# Patient Record
Sex: FEMALE | Race: WHITE | Employment: OTHER | URBAN - METROPOLITAN AREA
[De-identification: names, ages, dates, MRNs, and addresses within clinical notes are randomized per-mention and may not be internally consistent; named-entity substitution may affect disease eponyms.]

---

## 2018-01-09 ENCOUNTER — HOSPITAL ENCOUNTER (OUTPATIENT)
Dept: MAMMOGRAPHY | Age: 58
Discharge: HOME | End: 2018-01-09
Attending: FAMILY MEDICINE | Admitting: FAMILY MEDICINE

## 2018-01-09 DIAGNOSIS — Z12.31 SCREENING MAMMOGRAM, ENCOUNTER FOR: ICD-10-CM

## 2018-01-25 ENCOUNTER — HOSPITAL ENCOUNTER (OUTPATIENT)
Dept: MAMMOGRAPHY | Age: 58
Discharge: HOME | End: 2018-01-25
Attending: FAMILY MEDICINE | Admitting: FAMILY MEDICINE

## 2018-01-25 DIAGNOSIS — R92.8 ABNORMAL MAMMOGRAM: ICD-10-CM

## 2018-01-25 DIAGNOSIS — R92.8 OTHER ABNORMAL AND INCONCLUSIVE FINDINGS ON DIAGNOSTIC IMAGING OF BREAST: ICD-10-CM

## 2019-01-10 ENCOUNTER — APPOINTMENT (OUTPATIENT)
Dept: GENERAL RADIOLOGY | Age: 59
End: 2019-01-10
Payer: MEDICARE

## 2019-01-10 ENCOUNTER — HOSPITAL ENCOUNTER (EMERGENCY)
Age: 59
Discharge: HOME | End: 2019-01-10
Payer: MEDICARE

## 2019-01-10 VITALS
TEMPERATURE: 98.3 F | DIASTOLIC BLOOD PRESSURE: 66 MMHG | OXYGEN SATURATION: 96 % | HEART RATE: 74 BPM | SYSTOLIC BLOOD PRESSURE: 127 MMHG | RESPIRATION RATE: 18 BRPM

## 2019-01-10 DIAGNOSIS — J06.9 VIRAL URI WITH COUGH: Primary | ICD-10-CM

## 2019-01-10 LAB
EKG ATRIAL RATE: 76 BPM
EKG DIAGNOSIS: NORMAL
EKG P AXIS: 59 DEGREES
EKG P-R INTERVAL: 148 MS
EKG Q-T INTERVAL: 394 MS
EKG QRS DURATION: 84 MS
EKG QTC CALCULATION (BAZETT): 443 MS
EKG R AXIS: 43 DEGREES
EKG T AXIS: 45 DEGREES
EKG VENTRICULAR RATE: 76 BPM

## 2019-01-10 PROCEDURE — 71046 X-RAY EXAM CHEST 2 VIEWS: CPT

## 2019-01-10 PROCEDURE — 99283 EMERGENCY DEPT VISIT LOW MDM: CPT

## 2019-01-10 PROCEDURE — 93005 ELECTROCARDIOGRAM TRACING: CPT | Performed by: EMERGENCY MEDICINE

## 2019-01-10 PROCEDURE — 93010 ELECTROCARDIOGRAM REPORT: CPT | Performed by: INTERNAL MEDICINE

## 2019-01-10 RX ORDER — BROMPHENIRAMINE MALEATE, PSEUDOEPHEDRINE HYDROCHLORIDE, AND DEXTROMETHORPHAN HYDROBROMIDE 2; 30; 10 MG/5ML; MG/5ML; MG/5ML
10 SYRUP ORAL 4 TIMES DAILY PRN
Qty: 120 ML | Refills: 0 | Status: SHIPPED | OUTPATIENT
Start: 2019-01-10 | End: 2019-07-15 | Stop reason: ALTCHOICE

## 2019-01-10 ASSESSMENT — ENCOUNTER SYMPTOMS
EYE PAIN: 0
BACK PAIN: 0
CONSTIPATION: 0
SHORTNESS OF BREATH: 0
EYE REDNESS: 0
NAUSEA: 0
CHEST TIGHTNESS: 0
FACIAL SWELLING: 0
ABDOMINAL PAIN: 0
SORE THROAT: 0
COUGH: 1
RHINORRHEA: 0
DIARRHEA: 0

## 2019-07-15 ENCOUNTER — HOSPITAL ENCOUNTER (EMERGENCY)
Age: 59
Discharge: HOME | End: 2019-07-15
Attending: EMERGENCY MEDICINE
Payer: MEDICARE

## 2019-07-15 VITALS
TEMPERATURE: 97.5 F | DIASTOLIC BLOOD PRESSURE: 56 MMHG | SYSTOLIC BLOOD PRESSURE: 133 MMHG | WEIGHT: 210 LBS | RESPIRATION RATE: 18 BRPM | OXYGEN SATURATION: 97 % | BODY MASS INDEX: 37.2 KG/M2 | HEART RATE: 83 BPM

## 2019-07-15 DIAGNOSIS — S39.012A BACK STRAIN, INITIAL ENCOUNTER: Primary | ICD-10-CM

## 2019-07-15 DIAGNOSIS — R11.2 NON-INTRACTABLE VOMITING WITH NAUSEA, UNSPECIFIED VOMITING TYPE: ICD-10-CM

## 2019-07-15 LAB
A/G RATIO: 1.3 (ref 1.1–2.2)
ALBUMIN SERPL-MCNC: 4.5 G/DL (ref 3.4–5)
ALP BLD-CCNC: 85 U/L (ref 40–129)
ALT SERPL-CCNC: 24 U/L (ref 10–40)
ANION GAP SERPL CALCULATED.3IONS-SCNC: 13 MMOL/L (ref 3–16)
AST SERPL-CCNC: 20 U/L (ref 15–37)
BASOPHILS ABSOLUTE: 0 K/UL (ref 0–0.2)
BASOPHILS RELATIVE PERCENT: 0.6 %
BILIRUB SERPL-MCNC: 0.3 MG/DL (ref 0–1)
BILIRUBIN URINE: NEGATIVE
BLOOD, URINE: NEGATIVE
BUN BLDV-MCNC: 12 MG/DL (ref 7–20)
CALCIUM SERPL-MCNC: 9.5 MG/DL (ref 8.3–10.6)
CHLORIDE BLD-SCNC: 101 MMOL/L (ref 99–110)
CLARITY: CLEAR
CO2: 26 MMOL/L (ref 21–32)
COLOR: YELLOW
CREAT SERPL-MCNC: <0.5 MG/DL (ref 0.6–1.2)
EKG ATRIAL RATE: 73 BPM
EKG DIAGNOSIS: NORMAL
EKG P AXIS: 29 DEGREES
EKG P-R INTERVAL: 150 MS
EKG Q-T INTERVAL: 398 MS
EKG QRS DURATION: 88 MS
EKG QTC CALCULATION (BAZETT): 438 MS
EKG R AXIS: 29 DEGREES
EKG T AXIS: 22 DEGREES
EKG VENTRICULAR RATE: 73 BPM
EOSINOPHILS ABSOLUTE: 0.1 K/UL (ref 0–0.6)
EOSINOPHILS RELATIVE PERCENT: 1.9 %
GFR AFRICAN AMERICAN: >60
GFR NON-AFRICAN AMERICAN: >60
GLOBULIN: 3.5 G/DL
GLUCOSE BLD-MCNC: 158 MG/DL (ref 70–99)
GLUCOSE URINE: NEGATIVE MG/DL
HCT VFR BLD CALC: 39.2 % (ref 36–48)
HEMOGLOBIN: 13.3 G/DL (ref 12–16)
KETONES, URINE: NEGATIVE MG/DL
LEUKOCYTE ESTERASE, URINE: NEGATIVE
LIPASE: 45 U/L (ref 13–60)
LYMPHOCYTES ABSOLUTE: 2 K/UL (ref 1–5.1)
LYMPHOCYTES RELATIVE PERCENT: 32.5 %
MCH RBC QN AUTO: 30.6 PG (ref 26–34)
MCHC RBC AUTO-ENTMCNC: 34 G/DL (ref 31–36)
MCV RBC AUTO: 90 FL (ref 80–100)
MICROSCOPIC EXAMINATION: NORMAL
MONOCYTES ABSOLUTE: 0.4 K/UL (ref 0–1.3)
MONOCYTES RELATIVE PERCENT: 6.5 %
NEUTROPHILS ABSOLUTE: 3.7 K/UL (ref 1.7–7.7)
NEUTROPHILS RELATIVE PERCENT: 58.5 %
NITRITE, URINE: NEGATIVE
PDW BLD-RTO: 13.8 % (ref 12.4–15.4)
PH UA: 5.5 (ref 5–8)
PLATELET # BLD: 242 K/UL (ref 135–450)
PMV BLD AUTO: 7.9 FL (ref 5–10.5)
POTASSIUM REFLEX MAGNESIUM: 3.8 MMOL/L (ref 3.5–5.1)
PROTEIN UA: NEGATIVE MG/DL
RBC # BLD: 4.35 M/UL (ref 4–5.2)
SODIUM BLD-SCNC: 140 MMOL/L (ref 136–145)
SPECIFIC GRAVITY UA: <=1.005 (ref 1–1.03)
TOTAL PROTEIN: 8 G/DL (ref 6.4–8.2)
TROPONIN: <0.01 NG/ML
URINE REFLEX TO CULTURE: NORMAL
URINE TYPE: NORMAL
UROBILINOGEN, URINE: 0.2 E.U./DL
WBC # BLD: 6.3 K/UL (ref 4–11)

## 2019-07-15 PROCEDURE — 81003 URINALYSIS AUTO W/O SCOPE: CPT

## 2019-07-15 PROCEDURE — 93005 ELECTROCARDIOGRAM TRACING: CPT | Performed by: EMERGENCY MEDICINE

## 2019-07-15 PROCEDURE — 85025 COMPLETE CBC W/AUTO DIFF WBC: CPT

## 2019-07-15 PROCEDURE — 96375 TX/PRO/DX INJ NEW DRUG ADDON: CPT

## 2019-07-15 PROCEDURE — 84484 ASSAY OF TROPONIN QUANT: CPT

## 2019-07-15 PROCEDURE — 93010 ELECTROCARDIOGRAM REPORT: CPT | Performed by: INTERNAL MEDICINE

## 2019-07-15 PROCEDURE — 6370000000 HC RX 637 (ALT 250 FOR IP): Performed by: EMERGENCY MEDICINE

## 2019-07-15 PROCEDURE — 83690 ASSAY OF LIPASE: CPT

## 2019-07-15 PROCEDURE — 80053 COMPREHEN METABOLIC PANEL: CPT

## 2019-07-15 PROCEDURE — 99284 EMERGENCY DEPT VISIT MOD MDM: CPT

## 2019-07-15 PROCEDURE — 96374 THER/PROPH/DIAG INJ IV PUSH: CPT

## 2019-07-15 PROCEDURE — 6360000002 HC RX W HCPCS: Performed by: EMERGENCY MEDICINE

## 2019-07-15 RX ORDER — MONTELUKAST SODIUM 10 MG/1
10 TABLET ORAL NIGHTLY
COMMUNITY

## 2019-07-15 RX ORDER — AMMONIUM LACTATE 12 G/100G
CREAM TOPICAL 2 TIMES DAILY
COMMUNITY

## 2019-07-15 RX ORDER — FLUTICASONE PROPIONATE 50 MCG
1 SPRAY, SUSPENSION (ML) NASAL DAILY
COMMUNITY

## 2019-07-15 RX ORDER — KETOROLAC TROMETHAMINE 30 MG/ML
30 INJECTION, SOLUTION INTRAMUSCULAR; INTRAVENOUS ONCE
Status: COMPLETED | OUTPATIENT
Start: 2019-07-15 | End: 2019-07-15

## 2019-07-15 RX ORDER — ESOMEPRAZOLE MAGNESIUM 40 MG/1
40 CAPSULE, DELAYED RELEASE ORAL
COMMUNITY

## 2019-07-15 RX ORDER — ONDANSETRON 2 MG/ML
4 INJECTION INTRAMUSCULAR; INTRAVENOUS
Status: DISCONTINUED | OUTPATIENT
Start: 2019-07-15 | End: 2019-07-15 | Stop reason: HOSPADM

## 2019-07-15 RX ORDER — LIDOCAINE 4 G/G
1 PATCH TOPICAL ONCE
Status: DISCONTINUED | OUTPATIENT
Start: 2019-07-15 | End: 2019-07-15 | Stop reason: HOSPADM

## 2019-07-15 RX ORDER — 0.9 % SODIUM CHLORIDE 0.9 %
1000 INTRAVENOUS SOLUTION INTRAVENOUS ONCE
Status: DISCONTINUED | OUTPATIENT
Start: 2019-07-15 | End: 2019-07-15 | Stop reason: HOSPADM

## 2019-07-15 RX ORDER — NYSTATIN 100000 U/G
CREAM TOPICAL
COMMUNITY

## 2019-07-15 RX ADMIN — ONDANSETRON 4 MG: 2 INJECTION INTRAMUSCULAR; INTRAVENOUS at 18:40

## 2019-07-15 RX ADMIN — KETOROLAC TROMETHAMINE 30 MG: 30 INJECTION, SOLUTION INTRAMUSCULAR at 18:40

## 2019-07-15 ASSESSMENT — PAIN DESCRIPTION - LOCATION: LOCATION: CHEST;BACK

## 2019-07-15 ASSESSMENT — PAIN SCALES - GENERAL
PAINLEVEL_OUTOF10: 5
PAINLEVEL_OUTOF10: 5

## 2019-07-15 NOTE — ED PROVIDER NOTES
[address] ED      CHIEF COMPLAINT  Chest Pain (c/o chest and back pain.  ) and Back Pain       HISTORY OF PRESENT ILLNESS  Nidhi Gutierrez is a 62 y.o. female  who presents to the ED complaining of back pain. Patient states that earlier she was trying to take a large pill and it felt like it got stuck. She then vomited while at her apartment so went up to the office. They called 911 and EMS brought her here for further evaluation. She states that when the pill get stuck she was having some chest discomfort but that has resolved. No current abdominal pain or further vomiting. Vomitus was nonbloody and nonbilious. She is unable to really tell me when her last bowel movement was. She has a history of MR so history is somewhat limited secondary to this. The office staff is currently with her but did not witness the vomiting and only called EMS when she had presented to their office complaining of the back pain. She points to the right lateral lower back when asked where it hurts. She has been able to ambulate. No weakness or numbness or tingling. No saddle anesthesia. No loss of bowel or bladder control. No other complaints, modifying factors or associated symptoms. I have reviewed the following from the nursing documentation. Past Medical History:   Diagnosis Date    Allergic rhinitis     Diabetes mellitus ([address]     Hypercholesteremia     Mental retardation     No history of procedure 2/29/16    no past colonoscopy     Past Surgical History:   Procedure Laterality Date    ANKLE SURGERY      COLONOSCOPY  01/10/2012    TUBAL LIGATION      UPPER GASTROINTESTINAL ENDOSCOPY  2/29/16     History reviewed. No pertinent family history.   Social History     Socioeconomic History    Marital status: Single     Spouse name: Not on file    Number of children: Not on file    Years of education: Not on file    Highest education level: Not on file   Occupational History    Not on file supportive treatment and close follow-up. Reasons to return to the emergency department were discussed at length with both her as well as the office staff and all questions were answered prior to discharge. I estimate there is LOW risk for BOWEL OBSTRUCTION, ESOPHAGEAL FOREIGN BODY, AAA, ACUTE CORONARY SYNDROME, INTRACRANIAL HEMORRHAGE, MALIGNANT DYSRHYTHMIA or HYPERTENSION, PULMONARY EMBOLISM, SEPSIS, SUBARACHNOID HEMORRHAGE, SUBDURAL HEMATOMA, STROKE, or THORACIC AORTIC DISSECTION, thus I consider the discharge disposition reasonable. Greg Ayala and I have discussed the diagnosis and risks, and we agree with discharging home to follow-up with their primary doctor. We also discussed returning to the Emergency Department immediately if new or worsening symptoms occur. We have discussed the symptoms which are most concerning (e.g., bloody sputum, fever, worsening pain or shortness of breath, vomiting, weakness) that necessitate immediate return. During the patient's ED course, the patient was given:  Medications   ondansetron (ZOFRAN) injection 4 mg (4 mg Intravenous Given 7/15/19 1840)   lidocaine 4 % external patch 1 patch (1 patch Transdermal Patch Applied 7/15/19 1935)   0.9 % sodium chloride bolus (0 mLs Intravenous Held 7/15/19 1953)   ketorolac (TORADOL) injection 30 mg (30 mg Intravenous Given 7/15/19 1840)        CLINICAL IMPRESSION  1. Back strain, initial encounter    2. Non-intractable vomiting with nausea, unspecified vomiting type        Blood pressure (!) 133/56, pulse 83, temperature 97.5 °F (36.4 °C), temperature source Oral, resp. rate 18, weight 210 lb (95.3 kg), SpO2 97 %. DISPOSITION  Greg Ayala was discharged to home in stable condition. Patient was given scripts for the following medications. I counseled patient how to take these medications.    Discharge Medication List as of 7/15/2019  8:08 PM          Follow-up with:  Myriam Mahmood MD  [address] [place] [address] [address] [facility],[address]    Schedule an appointment as soon as possible for a visit in 2 days  For recheck      DISCLAIMER: This chart was created using Dragon dictation software. Efforts were made by me to ensure accuracy, however some errors may be present due to limitations of this technology and occasionally words are not transcribed correctly.         Thao Chavez MD  07/15/19 7278

## 2020-01-01 ENCOUNTER — HOSPITAL ENCOUNTER (EMERGENCY)
Age: 60
Discharge: HOME | End: 2020-01-02
Attending: EMERGENCY MEDICINE
Payer: MEDICARE

## 2020-01-01 ENCOUNTER — APPOINTMENT (OUTPATIENT)
Dept: GENERAL RADIOLOGY | Age: 60
End: 2020-01-01
Payer: MEDICARE

## 2020-01-01 LAB
A/G RATIO: 1.2 (ref 1.1–2.2)
ALBUMIN SERPL-MCNC: 4.4 G/DL (ref 3.4–5)
ALP BLD-CCNC: 76 U/L (ref 40–129)
ALT SERPL-CCNC: 28 U/L (ref 10–40)
ANION GAP SERPL CALCULATED.3IONS-SCNC: 12 MMOL/L (ref 3–16)
AST SERPL-CCNC: 24 U/L (ref 15–37)
BASOPHILS ABSOLUTE: 0 K/UL (ref 0–0.2)
BASOPHILS RELATIVE PERCENT: 0.7 %
BILIRUB SERPL-MCNC: <0.2 MG/DL (ref 0–1)
BILIRUBIN URINE: NEGATIVE
BLOOD, URINE: NEGATIVE
BUN BLDV-MCNC: 13 MG/DL (ref 7–20)
CALCIUM SERPL-MCNC: 9 MG/DL (ref 8.3–10.6)
CHLORIDE BLD-SCNC: 99 MMOL/L (ref 99–110)
CLARITY: ABNORMAL
CO2: 25 MMOL/L (ref 21–32)
COLOR: YELLOW
CREAT SERPL-MCNC: 0.6 MG/DL (ref 0.6–1.2)
D DIMER: <200 NG/ML DDU (ref 0–229)
EKG ATRIAL RATE: 81 BPM
EKG DIAGNOSIS: NORMAL
EKG P AXIS: 74 DEGREES
EKG P-R INTERVAL: 138 MS
EKG Q-T INTERVAL: 380 MS
EKG QRS DURATION: 78 MS
EKG QTC CALCULATION (BAZETT): 441 MS
EKG R AXIS: 21 DEGREES
EKG T AXIS: 36 DEGREES
EKG VENTRICULAR RATE: 81 BPM
EOSINOPHILS ABSOLUTE: 0.2 K/UL (ref 0–0.6)
EOSINOPHILS RELATIVE PERCENT: 2.8 %
GFR AFRICAN AMERICAN: >60
GFR NON-AFRICAN AMERICAN: >60
GLOBULIN: 3.6 G/DL
GLUCOSE BLD-MCNC: 152 MG/DL (ref 70–99)
GLUCOSE URINE: NEGATIVE MG/DL
HCT VFR BLD CALC: 41.3 % (ref 36–48)
HEMOGLOBIN: 13.7 G/DL (ref 12–16)
INR BLD: 1.14 (ref 0.86–1.14)
KETONES, URINE: NEGATIVE MG/DL
LEUKOCYTE ESTERASE, URINE: NEGATIVE
LYMPHOCYTES ABSOLUTE: 2.2 K/UL (ref 1–5.1)
LYMPHOCYTES RELATIVE PERCENT: 41 %
MCH RBC QN AUTO: 30.4 PG (ref 26–34)
MCHC RBC AUTO-ENTMCNC: 33.2 G/DL (ref 31–36)
MCV RBC AUTO: 91.7 FL (ref 80–100)
MICROSCOPIC EXAMINATION: ABNORMAL
MONOCYTES ABSOLUTE: 0.5 K/UL (ref 0–1.3)
MONOCYTES RELATIVE PERCENT: 8.6 %
NEUTROPHILS ABSOLUTE: 2.5 K/UL (ref 1.7–7.7)
NEUTROPHILS RELATIVE PERCENT: 46.9 %
NITRITE, URINE: NEGATIVE
PDW BLD-RTO: 13.9 % (ref 12.4–15.4)
PH UA: 5.5 (ref 5–8)
PLATELET # BLD: 220 K/UL (ref 135–450)
PMV BLD AUTO: 7.4 FL (ref 5–10.5)
POTASSIUM REFLEX MAGNESIUM: 3.9 MMOL/L (ref 3.5–5.1)
PRO-BNP: 31 PG/ML (ref 0–124)
PROTEIN UA: NEGATIVE MG/DL
PROTHROMBIN TIME: 13.2 SEC (ref 10–13.2)
RBC # BLD: 4.5 M/UL (ref 4–5.2)
SODIUM BLD-SCNC: 136 MMOL/L (ref 136–145)
SPECIFIC GRAVITY UA: >=1.03 (ref 1–1.03)
TOTAL PROTEIN: 8 G/DL (ref 6.4–8.2)
TROPONIN: <0.01 NG/ML
TROPONIN: <0.01 NG/ML
URINE REFLEX TO CULTURE: ABNORMAL
URINE TYPE: ABNORMAL
UROBILINOGEN, URINE: 0.2 E.U./DL
WBC # BLD: 5.3 K/UL (ref 4–11)

## 2020-01-01 PROCEDURE — 93010 ELECTROCARDIOGRAM REPORT: CPT | Performed by: INTERNAL MEDICINE

## 2020-01-01 PROCEDURE — 6370000000 HC RX 637 (ALT 250 FOR IP): Performed by: NURSE PRACTITIONER

## 2020-01-01 PROCEDURE — 99285 EMERGENCY DEPT VISIT HI MDM: CPT

## 2020-01-01 PROCEDURE — 84484 ASSAY OF TROPONIN QUANT: CPT

## 2020-01-01 PROCEDURE — 80053 COMPREHEN METABOLIC PANEL: CPT

## 2020-01-01 PROCEDURE — 93005 ELECTROCARDIOGRAM TRACING: CPT | Performed by: EMERGENCY MEDICINE

## 2020-01-01 PROCEDURE — 83880 ASSAY OF NATRIURETIC PEPTIDE: CPT

## 2020-01-01 PROCEDURE — 85379 FIBRIN DEGRADATION QUANT: CPT

## 2020-01-01 PROCEDURE — 85025 COMPLETE CBC W/AUTO DIFF WBC: CPT

## 2020-01-01 PROCEDURE — 81003 URINALYSIS AUTO W/O SCOPE: CPT

## 2020-01-01 PROCEDURE — 85610 PROTHROMBIN TIME: CPT

## 2020-01-01 PROCEDURE — 71046 X-RAY EXAM CHEST 2 VIEWS: CPT

## 2020-01-01 RX ORDER — ASPIRIN 325 MG
325 TABLET ORAL ONCE
Status: COMPLETED | OUTPATIENT
Start: 2020-01-01 | End: 2020-01-01

## 2020-01-01 RX ADMIN — ASPIRIN 325 MG: 325 TABLET ORAL at 19:20

## 2020-01-01 ASSESSMENT — HEART SCORE: ECG: 0

## 2020-01-01 ASSESSMENT — ENCOUNTER SYMPTOMS
RHINORRHEA: 0
ABDOMINAL PAIN: 0
SHORTNESS OF BREATH: 0
COLOR CHANGE: 0
SORE THROAT: 0

## 2020-01-01 ASSESSMENT — PAIN DESCRIPTION - LOCATION: LOCATION: CHEST

## 2020-01-01 ASSESSMENT — PAIN SCALES - WONG BAKER: WONGBAKER_NUMERICALRESPONSE: 4

## 2020-01-02 ENCOUNTER — HOSPITAL ENCOUNTER (OUTPATIENT)
Dept: ULTRASOUND IMAGING | Age: 60
Discharge: HOME | End: 2020-01-02
Payer: MEDICARE

## 2020-01-02 VITALS
HEART RATE: 76 BPM | RESPIRATION RATE: 16 BRPM | BODY MASS INDEX: 37.21 KG/M2 | TEMPERATURE: 97.5 F | HEIGHT: 63 IN | WEIGHT: 210 LBS | DIASTOLIC BLOOD PRESSURE: 61 MMHG | OXYGEN SATURATION: 97 % | SYSTOLIC BLOOD PRESSURE: 114 MMHG

## 2020-01-02 PROCEDURE — 6370000000 HC RX 637 (ALT 250 FOR IP): Performed by: NURSE PRACTITIONER

## 2020-01-02 PROCEDURE — 93971 EXTREMITY STUDY: CPT

## 2020-01-02 RX ADMIN — APIXABAN 10 MG: 5 TABLET, FILM COATED ORAL at 00:09

## 2020-01-02 NOTE — ED PROVIDER NOTES
I independently performed a history and physical on Lex Stewart. All diagnostic, treatment, and disposition decisions were made by myself in conjunction with the advanced practice provider. For further details of [address] [place] [address] emergency department encounter, please see Karol Sanford NP's documentation. Patient is a 78-year-old female presenting today with mild chest discomfort in the center of her chest starting around 6 PM this evening associated with palpitations. She denies any shortness of breath. No radiation into the back, arms, neck. No pain with breathing. No history of blood clots. She did recently break her foot on the left side and does have a boot on. No cough or fever. Due to concern for chest discomfort, she came to the emergency department for further evaluation. Physical:   Gen: No acute distress. AOx3. Psych: Normal mood and affect  HEENT: NCAT, PERRL, MMM  Neck: supple  Chest: Mild tenderness to superior sternum at the area where she is pointing that it hurts   Cardiac: RRR, pulses 2+ in upper extremities, no calf tenderness but 1+ pitting edema to LLE noted on exam, boot on left foot   Lungs: C2AB, no R/R/W  Abdomen: soft and nontender with no R/D/G  Neuro: no focal neuro deficits with strength 5/5 in all 4 extremities          The Ekg interpreted by me shows  normal sinus rhythm with a rate of 81  Axis is   Normal  QTc is  normal  Intervals and Durations are unremarkable. ST Segments: no acute change and normal  No significant change from prior EKG dated - 7/15/19  No STEMI       MDM: Patient is a 78-year-old female presenting today due to concern for chest discomfort associated with palpitations starting around 6 PM.  When I saw her she pointed to the center of her chest and did have palpable tenderness there. EKG did not show any concerning findings. Troponin was negative. D-dimer was negative and story not suggestive of pulmonary embolism.   Since she does have some swelling to the left leg, we will send her home with an ultrasound order and a dose of anticoagulation and have her follow-up tomorrow for an order to rule out blood clot but I do feel this is less likely especially with negative D dimer. HEART score = 3 and story not suggestive of acute coronary syndrome. She will receive cardiology referral as long as repeat troponin is normal for urgent outpatient evaluation. Otherwise, she is to return to the emergency department for any other concerns. She was well-appearing and in no acute distress when I saw her.         Miguel Chi MD  01/01/20 2679

## 2020-01-02 NOTE — ED NOTES
[facility] director of assisted living left her number  in case of questions or need for transport home.       Carlos Alberto Stokes RN  01/01/20 9648

## 2020-01-02 NOTE — ED PROVIDER NOTES
ROS, all other systems were reviewed and negative. PAST MEDICAL HISTORY     Past Medical History:   Diagnosis Date    Allergic rhinitis     Diabetes mellitus ([facility] [address]     Hypercholesteremia     Mental retardation     No history of procedure 2/29/16    no past colonoscopy         SURGICAL HISTORY       Past Surgical History:   Procedure Laterality Date    ANKLE SURGERY      COLONOSCOPY  01/10/2012    TUBAL LIGATION      UPPER GASTROINTESTINAL ENDOSCOPY  2/29/16         CURRENT MEDICATIONS       Previous Medications    AMMONIUM LACTATE (AMLACTIN) 12 % CREAM    Apply topically 2 times daily Apply twice daily to feet. ATORVASTATIN (LIPITOR) 20 MG TABLET    Take 20 mg by mouth daily. CICLOPIROX (LOPROX) 0.77 % CREAM    Apply topically 2 times daily Apply topically 2 times daily. ESOMEPRAZOLE (NEXIUM) 40 MG DELAYED RELEASE CAPSULE    Take 40 mg by mouth every morning (before breakfast)    FEXOFENADINE (ALLEGRA) 180 MG TABLET    Take 180 mg by mouth daily. FLUOXETINE (PROZAC) 20 MG CAPSULE    Take 40 mg by mouth daily. FLUTICASONE (FLONASE) 50 MCG/ACT NASAL SPRAY    1 spray by Each Nostril route daily    METFORMIN (GLUCOPHAGE) 500 MG TABLET    Take 500 mg by mouth daily    MONTELUKAST (SINGULAIR) 10 MG TABLET    Take 10 mg by mouth nightly    NABUMETONE (RELAFEN) 500 MG TABLET    Take 750 mg by mouth daily     NYSTATIN (MYCOSTATIN) 381308 UNIT/GM CREAM    Apply topically Three times per week    PROPYLTHIOURACIL (PTU) 50 MG TABLET    Take 50 mg by mouth 2 times daily. SUCRALFATE (CARAFATE) 1 GM/10ML SUSPENSION    Take 10 mLs by mouth 4 times daily for 7 days. THIORIDAZINE (MELLARIL) 25 MG TABLET    Take 25 mg by mouth Daily. Indications: HS         ALLERGIES     Patient has no known allergies. FAMILY HISTORY     History reviewed. No pertinent family history.       SOCIAL HISTORY       Social History     Socioeconomic History    Marital status: Single     Spouse name: None    Number of motion. Cardiovascular:      Rate and Rhythm: Normal rate. Pulmonary:      Effort: Pulmonary effort is normal. No respiratory distress. Abdominal:      General: There is no distension. Palpations: Abdomen is soft. Musculoskeletal: Normal range of motion. Skin:     General: Skin is warm and dry. Neurological:      Mental Status: She is alert and oriented to person, place, and time.          DIAGNOSTIC RESULTS   LABS:    Labs Reviewed   COMPREHENSIVE METABOLIC PANEL W/ REFLEX TO MG FOR LOW K - Abnormal; Notable for the following components:       Result Value    Glucose 152 (*)     All other components within normal limits    Narrative:     Performed at:  [facility]) - [place] [address],  [place], [employer]   Phone [phone]   URINE RT REFLEX TO CULTURE - Abnormal; Notable for the following components:    Clarity, UA SL CLOUDY (*)     All other components within normal limits    Narrative:     Performed at:  [place] [address],  [place], [employer]   Phone [phone]   CBC WITH AUTO DIFFERENTIAL    Narrative:     Performed at:  [place] [address],  [place], [employer]   Phone [phone]   PROTIME-INR    Narrative:     Performed at:  [place] [address],  [place], [employer]   Phone [phone]   TROPONIN    Narrative:     Performed at:  [place] [address],  [place], [employer]   Phone [phone]   BRAIN NATRIURETIC PEPTIDE    Narrative:     Performed at:  [place] [address],  [place], [employer]   Phone [phone]   TROPONIN    Narrative:     Performed at:  [place] [address],  [place], [employer]   Phone [phone]   D-DIMER, QUANTITATIVE    Narrative:     Performed at:  [facility]) [facility]  [address],  [place], [place]   Phone [phone]       All other labs were within normal range or notreturned as of this dictation. EKG: All EKG's are interpreted by the Emergency Department Physician who either signs or Co-signs this chart in the absence of a cardiologist.  Please see their note for interpretation of EKG. RADIOLOGY:     Chest x-ray interpreted by radiologist for no acute cardiopulmonary disease. Interpretation per the Radiologist below, if available at the time of this note:    XR CHEST STANDARD (2 VW)   Final Result   No acute cardiopulmonary disease. VL Extremity Venous Left    (Results Pending)     No results found. PROCEDURES   Unless otherwise noted below, none     Procedures    CRITICAL CARE TIME   N/A    CONSULTS:  None      EMERGENCY DEPARTMENT COURSE and DIFFERENTIAL DIAGNOSIS/MDM:   Vitals:    Vitals:    01/01/20 1938 01/01/20 2005 01/01/20 2107 01/01/20 2237   BP: 102/84 121/76 110/65 (!) 112/55   Pulse:  79 80 75   Resp:  17     Temp:       TempSrc:       SpO2: 98% 95% 96% 95%   Weight:       Height:           Patient was given the following medications:  Medications   aspirin tablet 325 mg (325 mg Oral Given 1/1/20 1920)   apixaban (ELIQUIS) tablet 10 mg (10 mg Oral Given 1/2/20 0009)       Patient was seen and evaluated by myself and Dr. Maribel Ricks. Patient here for complaints of chest pain that started earlier today. Patient has a history of mental delay and hearing issues. Lab values were reviewed and interpreted. Patient did have a heart score of 3 however her repeat troponin was negative. On exam she is awake and alert hemodynamically stable nontoxic in appearance. Her chest pain is reproducible. At this point the patient will be given a dose of Eliquis because she is currently in a boot for her left ankle and she was given a prescription for an outpatient ultrasound.   Caregivers were given instructions to

## 2020-02-05 ENCOUNTER — HOSPITAL ENCOUNTER (OUTPATIENT)
Dept: MAMMOGRAPHY | Age: 60
Discharge: HOME | End: 2020-02-05
Payer: MEDICARE

## 2020-02-05 PROCEDURE — 77067 SCR MAMMO BI INCL CAD: CPT

## 2021-02-24 ENCOUNTER — HOSPITAL ENCOUNTER (OUTPATIENT)
Dept: MAMMOGRAPHY | Age: 61
Discharge: HOME | End: 2021-02-24
Payer: MEDICARE

## 2021-02-24 ENCOUNTER — TELEPHONE (OUTPATIENT)
Dept: MAMMOGRAPHY | Age: 61
End: 2021-02-24

## 2021-02-24 DIAGNOSIS — Z12.31 SCREENING MAMMOGRAM, ENCOUNTER FOR: ICD-10-CM

## 2021-02-24 PROCEDURE — 77063 BREAST TOMOSYNTHESIS BI: CPT

## 2021-11-06 ENCOUNTER — APPOINTMENT (OUTPATIENT)
Dept: CT IMAGING | Age: 61
End: 2021-11-06
Payer: MEDICARE

## 2021-11-06 ENCOUNTER — HOSPITAL ENCOUNTER (EMERGENCY)
Age: 61
Discharge: ANOTHER HOSPITAL | End: 2021-11-06
Attending: EMERGENCY MEDICINE
Payer: MEDICARE

## 2021-11-06 VITALS
SYSTOLIC BLOOD PRESSURE: 143 MMHG | WEIGHT: 188 LBS | HEIGHT: 63 IN | BODY MASS INDEX: 33.31 KG/M2 | OXYGEN SATURATION: 95 % | DIASTOLIC BLOOD PRESSURE: 73 MMHG | TEMPERATURE: 98.2 F | HEART RATE: 73 BPM | RESPIRATION RATE: 16 BRPM

## 2021-11-06 DIAGNOSIS — J94.2 HEMOPNEUMOTHORAX ON RIGHT: ICD-10-CM
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
## 2021-11-06 DIAGNOSIS — S22.41XA CLOSED FRACTURE OF MULTIPLE RIBS OF RIGHT SIDE, INITIAL ENCOUNTER: Primary | ICD-10-CM

## 2021-11-06 LAB
A/G RATIO: 1.4 (ref 1.1–2.2)
ALBUMIN SERPL-MCNC: 4.4 G/DL (ref 3.4–5)
ALP BLD-CCNC: 80 U/L (ref 40–129)
ALT SERPL-CCNC: 22 U/L (ref 10–40)
ANION GAP SERPL CALCULATED.3IONS-SCNC: 10 MMOL/L (ref 3–16)
AST SERPL-CCNC: 20 U/L (ref 15–37)
BASOPHILS ABSOLUTE: 0.1 K/UL (ref 0–0.2)
BASOPHILS RELATIVE PERCENT: 1 %
BILIRUB SERPL-MCNC: 0.6 MG/DL (ref 0–1)
BUN BLDV-MCNC: 14 MG/DL (ref 7–20)
CALCIUM SERPL-MCNC: 9.5 MG/DL (ref 8.3–10.6)
CHLORIDE BLD-SCNC: 98 MMOL/L (ref 99–110)
CO2: 30 MMOL/L (ref 21–32)
CREAT SERPL-MCNC: 0.6 MG/DL (ref 0.6–1.2)
EOSINOPHILS ABSOLUTE: 0.2 K/UL (ref 0–0.6)
EOSINOPHILS RELATIVE PERCENT: 2.5 %
GFR AFRICAN AMERICAN: >60
GFR NON-AFRICAN AMERICAN: >60
GLUCOSE BLD-MCNC: 134 MG/DL (ref 70–99)
HCT VFR BLD CALC: 40 % (ref 36–48)
HEMOGLOBIN: 13.3 G/DL (ref 12–16)
LYMPHOCYTES ABSOLUTE: 1.7 K/UL (ref 1–5.1)
LYMPHOCYTES RELATIVE PERCENT: 22.7 %
MAGNESIUM: 1.8 MG/DL (ref 1.8–2.4)
MCH RBC QN AUTO: 29.8 PG (ref 26–34)
MCHC RBC AUTO-ENTMCNC: 33.4 G/DL (ref 31–36)
MCV RBC AUTO: 89.3 FL (ref 80–100)
MONOCYTES ABSOLUTE: 0.5 K/UL (ref 0–1.3)
MONOCYTES RELATIVE PERCENT: 7.4 %
NEUTROPHILS ABSOLUTE: 4.8 K/UL (ref 1.7–7.7)
NEUTROPHILS RELATIVE PERCENT: 66.4 %
PDW BLD-RTO: 13.2 % (ref 12.4–15.4)
PLATELET # BLD: 267 K/UL (ref 135–450)
PMV BLD AUTO: 7.1 FL (ref 5–10.5)
POTASSIUM REFLEX MAGNESIUM: 3.5 MMOL/L (ref 3.5–5.1)
RBC # BLD: 4.48 M/UL (ref 4–5.2)
SODIUM BLD-SCNC: 138 MMOL/L (ref 136–145)
TOTAL PROTEIN: 7.5 G/DL (ref 6.4–8.2)
TROPONIN: <0.01 NG/ML
WBC # BLD: 7.3 K/UL (ref 4–11)

## 2021-11-06 PROCEDURE — 6370000000 HC RX 637 (ALT 250 FOR IP): Performed by: EMERGENCY MEDICINE

## 2021-11-06 PROCEDURE — 71250 CT THORAX DX C-: CPT

## 2021-11-06 PROCEDURE — 80053 COMPREHEN METABOLIC PANEL: CPT

## 2021-11-06 PROCEDURE — 96374 THER/PROPH/DIAG INJ IV PUSH: CPT

## 2021-11-06 PROCEDURE — 99284 EMERGENCY DEPT VISIT MOD MDM: CPT

## 2021-11-06 PROCEDURE — 84484 ASSAY OF TROPONIN QUANT: CPT

## 2021-11-06 PROCEDURE — 6360000002 HC RX W HCPCS: Performed by: EMERGENCY MEDICINE

## 2021-11-06 PROCEDURE — 83735 ASSAY OF MAGNESIUM: CPT

## 2021-11-06 PROCEDURE — 85025 COMPLETE CBC W/AUTO DIFF WBC: CPT

## 2021-11-06 PROCEDURE — 96375 TX/PRO/DX INJ NEW DRUG ADDON: CPT

## 2021-11-06 PROCEDURE — 70450 CT HEAD/BRAIN W/O DYE: CPT

## 2021-11-06 PROCEDURE — 93005 ELECTROCARDIOGRAM TRACING: CPT | Performed by: EMERGENCY MEDICINE

## 2021-11-06 PROCEDURE — 36415 COLL VENOUS BLD VENIPUNCTURE: CPT

## 2021-11-06 RX ORDER — LIDOCAINE 4 G/G
1 PATCH TOPICAL ONCE
Status: DISCONTINUED | OUTPATIENT
Start: 2021-11-06 | End: 2021-11-06 | Stop reason: HOSPADM

## 2021-11-06 RX ORDER — ASPIRIN 81 MG/1
81 TABLET ORAL DAILY
COMMUNITY
Start: 2021-04-30

## 2021-11-06 RX ORDER — BENZTROPINE MESYLATE 1 MG/1
TABLET ORAL
COMMUNITY
Start: 2021-10-25

## 2021-11-06 RX ORDER — ARIPIPRAZOLE 5 MG/1
TABLET ORAL
COMMUNITY
Start: 2021-10-25

## 2021-11-06 RX ORDER — KETOROLAC TROMETHAMINE 30 MG/ML
30 INJECTION, SOLUTION INTRAMUSCULAR; INTRAVENOUS ONCE
Status: COMPLETED | OUTPATIENT
Start: 2021-11-06 | End: 2021-11-06

## 2021-11-06 RX ADMIN — KETOROLAC TROMETHAMINE 30 MG: 30 INJECTION, SOLUTION INTRAMUSCULAR at 15:46

## 2021-11-06 RX ADMIN — HYDROMORPHONE HYDROCHLORIDE 0.5 MG: 1 INJECTION, SOLUTION INTRAMUSCULAR; INTRAVENOUS; SUBCUTANEOUS at 17:18

## 2021-11-06 ASSESSMENT — PAIN SCALES - WONG BAKER: WONGBAKER_NUMERICALRESPONSE: 8

## 2021-11-06 ASSESSMENT — PAIN DESCRIPTION - PAIN TYPE: TYPE: ACUTE PAIN

## 2021-11-06 ASSESSMENT — PAIN SCALES - GENERAL
PAINLEVEL_OUTOF10: 9
PAINLEVEL_OUTOF10: 8

## 2021-11-06 NOTE — ED PROVIDER NOTES
[address]. [address] [facility]  Fall (Pt fell last saturday and has bruising on her R side. Pt does not seem to be getting better)       HISTORY OF PRESENT ILLNESS  Jet Jasso is a 59 y.o. female  who presents to the ED complaining of fall last Saturday. Have been watching it and just doesn't seem to be getting better. Patient lives alone in an assisted living type setting. It was an unwitnessed fall. They found out the next day and noted to be sore on right side/ribs but she has just not been as peppy as usual.  Now with some possible sore throat and speech affected \"like laryngitis\" per the caretaker. Not eating as much.  +diarrhea. Pt with hx MR so limited hx. Denied dizziness to staff members. Was fine before the fall-- had been with staff earlier in that day. Hx DM so they checked her sugar and seemed good. Hx mainly obtained from caregiver, who is at bedside. Received Ibuprofen earlier today for pain. Not on blood thinners. Immunized against COVID. Has not had her flu shot yet this year. No other complaints, modifying factors or associated symptoms. I have reviewed the following from the nursing documentation. Past Medical History:   Diagnosis Date    Allergic rhinitis     Diabetes mellitus ([address]     Hypercholesteremia     Mental retardation     No history of procedure 2/29/16    no past colonoscopy     Past Surgical History:   Procedure Laterality Date    ANKLE SURGERY      COLONOSCOPY  01/10/2012    TUBAL LIGATION      UPPER GASTROINTESTINAL ENDOSCOPY  2/29/16     History reviewed. No pertinent family history.   Social History     Socioeconomic History    Marital status: Single     Spouse name: Not on file    Number of children: Not on file    Years of education: Not on file    Highest education level: Not on file   Occupational History    Not on file   Tobacco Use    Smoking status: Never Smoker    Smokeless tobacco: Never Used   Substance and Sexual Activity    Alcohol use: No    Drug use: No    Sexual activity: Never   Other Topics Concern    Not on file   Social History Narrative    Not on file     Social Determinants of Health     Financial Resource Strain:     Difficulty of Paying Living Expenses: Not on file   Food Insecurity:     Worried About Running Out of Food in the Last Year: Not on file    Juancarlos of Food in the Last Year: Not on file   Transportation Needs:     Lack of Transportation (Medical): Not on file    Lack of Transportation (Non-Medical):  Not on file   Physical Activity:     Days of Exercise per Week: Not on file    Minutes of Exercise per Session: Not on file   Stress:     Feeling of Stress : Not on file   Social Connections:     Frequency of Communication with Friends and Family: Not on file    Frequency of Social Gatherings with Friends and Family: Not on file    Attends Muslim Services: Not on file    Active Member of [address] [place] [address] [employer] or Organizations: Not on file    Attends Club or Organization Meetings: Not on file    Marital Status: Not on file   Intimate Partner Violence:     Fear of Current or Ex-Partner: Not on file    Emotionally Abused: Not on file    Physically Abused: Not on file    Sexually Abused: Not on file   Housing Stability:     Unable to Pay for Housing in the Last Year: Not on file    [facility]: Not on file    Unstable Housing in the Last Year: Not on file     Current Facility-Administered Medications   Medication Dose Route Frequency Provider Last Rate Last Admin    lidocaine 4 % external patch 1 patch  1 patch TransDERmal Once Julius Cerna MD   1 patch at 11/06/21 1546    HYDROmorphone (DILAUDID) injection 0.5 mg  0.5 mg IntraVENous Q1H PRN Julius Cerna MD   0.5 mg at 11/06/21 1718     Current Outpatient Medications   Medication Sig Dispense Refill    ARIPiprazole (ABILIFY) 5 MG tablet       benztropine (COGENTIN) 1 MG tablet       aspirin 81 MG EC tablet Take 81 mg by mouth daily      esomeprazole (NEXIUM) 40 MG delayed release capsule Take 40 mg by mouth every morning (before breakfast)      montelukast (SINGULAIR) 10 MG tablet Take 10 mg by mouth nightly      metFORMIN (GLUCOPHAGE) 500 MG tablet Take 500 mg by mouth daily 2 tablets daily      nabumetone (RELAFEN) 500 MG tablet Take 750 mg by mouth daily       propylthiouracil (PTU) 50 MG tablet Take 50 mg by mouth 3 times daily       atorvastatin (LIPITOR) 20 MG tablet Take 20 mg by mouth daily.  fexofenadine (ALLEGRA) 180 MG tablet Take 180 mg by mouth daily.  fluoxetine (PROZAC) 20 MG capsule Take 40 mg by mouth daily.  thioridazine (MELLARIL) 25 MG tablet Take 25 mg by mouth Daily. Indications: HS      fluticasone (FLONASE) 50 MCG/ACT nasal spray 1 spray by Each Nostril route daily      nystatin (MYCOSTATIN) 904264 UNIT/GM cream Apply topically Three times per week      ciclopirox (LOPROX) 0.77 % cream Apply topically 2 times daily Apply topically 2 times daily.  ammonium lactate (AMLACTIN) 12 % cream Apply topically 2 times daily Apply twice daily to feet.  sucralfate (CARAFATE) 1 GM/10ML suspension Take 10 mLs by mouth 4 times daily for 7 days. 280 mL 0     No Known Allergies    REVIEW OF SYSTEMS  10 systems reviewed, pertinent positives per HPI otherwise noted to be negative. PHYSICAL EXAM  BP (!) 143/73   Pulse 73   Temp 98.2 °F (36.8 °C) (Oral)   Resp 16   Ht 5' 3\" (1.6 m)   Wt 188 lb (85.3 kg)   SpO2 95%   BMI 33.30 kg/m²    GENERAL APPEARANCE: Awake and alert. Cooperative. No acute distress. HENT: Normocephalic. Atraumatic. Mucous membranes are moist.  No drooling or stridor. No posterior oropharyngeal erythema or exudate. Uvula midline and nonedematous. No unilateral swelling fullness of the tonsillar pillars bilaterally. NECK: Supple. No cervical lymphadenopathy. No nuchal rigidity. No central c-spine tenderness or stepoffs.   Full neck ROM without limitation. EYES: PERRL. EOM's grossly intact. HEART/CHEST: RRR. No murmurs. 2+ radial pulses b/l. Chest wall exam revealed mild amount of ecchymoses noted with significant tenderness to the right lateral rib cage area without any crepitus or bony step-offs. No additional chest wall tenderness. LUNGS: Respirations unlabored. CTAB. Good air exchange. Speaking comfortably in full sentences. ABDOMEN: No tenderness. Soft. Non-distended. No masses. No organomegaly. No guarding or rebound. MUSCULOSKELETAL: No extremity edema. Compartments soft. No deformity. No tenderness in the extremities. All extremities neurovascularly intact. SKIN: Warm and dry. No acute rashes. NEUROLOGICAL: Alert and oriented. CN's 2-12 intact. No gross facial drooping. Strength 5/5, sensation intact. No gross focal deficits. PSYCHIATRIC: Normal mood and affect. LABS  I have reviewed all labs for this visit.    Results for orders placed or performed during the hospital encounter of 11/06/21   CBC Auto Differential   Result Value Ref Range    WBC 7.3 4.0 - 11.0 K/uL    RBC 4.48 4.00 - 5.20 M/uL    Hemoglobin 13.3 12.0 - 16.0 g/dL    Hematocrit 40.0 36.0 - 48.0 %    MCV 89.3 80.0 - 100.0 fL    MCH 29.8 26.0 - 34.0 pg    MCHC 33.4 31.0 - 36.0 g/dL    RDW 13.2 12.4 - 15.4 %    Platelets 486 638 - 311 K/uL    MPV 7.1 5.0 - 10.5 fL    Neutrophils % 66.4 %    Lymphocytes % 22.7 %    Monocytes % 7.4 %    Eosinophils % 2.5 %    Basophils % 1.0 %    Neutrophils Absolute 4.8 1.7 - 7.7 K/uL    Lymphocytes Absolute 1.7 1.0 - 5.1 K/uL    Monocytes Absolute 0.5 0.0 - 1.3 K/uL    Eosinophils Absolute 0.2 0.0 - 0.6 K/uL    Basophils Absolute 0.1 0.0 - 0.2 K/uL   Comprehensive Metabolic Panel w/ Reflex to MG   Result Value Ref Range    Sodium 138 136 - 145 mmol/L    Potassium reflex Magnesium 3.5 3.5 - 5.1 mmol/L    Chloride 98 (L) 99 - 110 mmol/L    CO2 30 21 - 32 mmol/L    Anion Gap 10 3 - 16    Glucose 134 (H) 70 - 99 mg/dL    BUN 14 7 - 20 mg/dL    CREATININE 0.6 0.6 - 1.2 mg/dL    GFR Non-African American >60 >60    GFR African American >60 >60    Calcium 9.5 8.3 - 10.6 mg/dL    Total Protein 7.5 6.4 - 8.2 g/dL    Albumin 4.4 3.4 - 5.0 g/dL    Albumin/Globulin Ratio 1.4 1.1 - 2.2    Total Bilirubin 0.6 0.0 - 1.0 mg/dL    Alkaline Phosphatase 80 40 - 129 U/L    ALT 22 10 - 40 U/L    AST 20 15 - 37 U/L   Troponin   Result Value Ref Range    Troponin <0.01 <0.01 ng/mL   Magnesium   Result Value Ref Range    Magnesium 1.80 1.80 - 2.40 mg/dL   EKG 12 Lead   Result Value Ref Range    Ventricular Rate 78 BPM    Atrial Rate 78 BPM    P-R Interval 128 ms    QRS Duration 82 ms    Q-T Interval 392 ms    QTc Calculation (Bazett) 446 ms    P Axis 75 degrees    R Axis 5 degrees    T Axis 5 degrees    Diagnosis       Normal sinus rhythmNormal ECGNo previous ECGs available       RADIOLOGY  CT Head WO Contrast    Result Date: 11/6/2021  EXAMINATION: CT OF THE HEAD WITHOUT CONTRAST  11/6/2021 2:49 pm TECHNIQUE: CT of the head was performed without the administration of intravenous contrast. Dose modulation, iterative reconstruction, and/or weight based adjustment of the mA/kV was utilized to reduce the radiation dose to as low as reasonably achievable. COMPARISON: None. HISTORY: ORDERING SYSTEM PROVIDED HISTORY: fall, unwitnessed TECHNOLOGIST PROVIDED HISTORY: Reason for exam:->fall, unwitnessed Has a \"code stroke\" or \"stroke alert\" been called? ->No Decision Support Exception - unselect if not a suspected or confirmed emergency medical condition->Emergency Medical Condition (MA) Reason for Exam: Fall Acuity: Acute Type of Exam: Initial FINDINGS: BRAIN/VENTRICLES: There is no acute intracranial hemorrhage, mass effect or midline shift. No abnormal extra-axial fluid collection. The gray-white differentiation is maintained without evidence of an acute infarct. There is no evidence of hydrocephalus.  ORBITS: The visualized portion of the orbits demonstrate no acute abnormality. SINUSES: The visualized paranasal sinuses and mastoid air cells demonstrate no acute abnormality. SOFT TISSUES/SKULL:  No acute abnormality of the visualized skull or soft tissues. No acute intracranial abnormality. CT CHEST WO CONTRAST    Result Date: 11/6/2021  EXAMINATION: CT OF THE CHEST WITHOUT CONTRAST 11/6/2021 3:49 pm TECHNIQUE: CT of the chest was performed without the administration of intravenous contrast. Multiplanar reformatted images are provided for review. Dose modulation, iterative reconstruction, and/or weight based adjustment of the mA/kV was utilized to reduce the radiation dose to as low as reasonably achievable. COMPARISON: None. HISTORY: ORDERING SYSTEM PROVIDED HISTORY: right ribs TECHNOLOGIST PROVIDED HISTORY: Reason for exam:->right ribs Decision Support Exception - unselect if not a suspected or confirmed emergency medical condition->Emergency Medical Condition (MA) Reason for Exam: Fall, right rib pain Acuity: Acute Type of Exam: Initial FINDINGS: Mediastinum: Heart is borderline enlarged. No pericardial effusion. Aorta is within normal limits in size. Right pulmonary artery is enlarged. Left pulmonary artery is within normal limits in size. .  Coronary artery calcifications noted. Lungs/pleura: Central airways are patent. Right middle and lower lobe opacities. Scattered atelectasis. Granuloma. Right hemopneumothorax with moderate pneumothorax component. Soft Tissues/Bones: Suboptimal evaluation for adenopathy without intravenous contrast. Acute displaced right posterior 8th through 12th rib fractures. Acute displaced right lateral 7th through 10th rib fractures. Scattered degenerative changes noted in the visualized spine with spondylolisthesis. No acute fracture in the imaged spine on this nondedicated exam.  No acute sternal fracture. Contusion in the soft tissues of the right posterolateral chest and abdominal wall.   Soft tissue air throughout the right chest extending into the neck. Upper Abdomen: Limited images of the upper abdomen are unremarkable given lack of intravenous contrast.     1. Acute displaced right-sided rib fractures, including multiple fractures per rib, with right hemopneumothorax. Moderate pneumothorax component. 2. Right middle and lower lobe opacities could represent pulmonary contusions. 3. Other findings as described. Critical results were called by Dr. Aleksandr Kelley DO to Dr. Pricilla Torres on 11/6/2021 at 16:48. The Ekg interpreted by me shows  normal sinus rhythm with a rate of 78  Axis is   Normal  QTc is  normal  Intervals and Durations are unremarkable. ST Segments: no acute change  No significant change from prior EKG dated 1/1/20      ED COURSE/MDM  Patient seen and evaluated. Old records reviewed. Labs and imaging reviewed and results discussed with patient. Patient status post unwitnessed fall with right sided rib pain and CT scan of the chest reveals acute displaced multiple right-sided rib fractures with hemopneumothorax. She sustained this injury no 1 week ago. Due to her history of MR began a freestanding ER, considering her current stability and lack of respiratory distress, increased work of breathing or hypoxia, I felt that was reasonable to defer placement of a large bore chest tube until she can be transferred to a facility with increased capabilities of backup. I have consulted the trauma service as well as the ER provider at [facility] for ER to ER transfer for further trauma evaluation and management of this patient with multiple rib fractures and hemopneumothorax status post fall. I talked to Dr. Jayleen Jensen who is accepted the patient transfer to [facility]. She will be transferred via [address] [facility] ambulance service. Patient and caretaker updated on plan of care. Pain control given. All questions answered at time of transfer. No additional injuries noted at this time.   CT scan of the head was negative for intracranial hemorrhage or injury. During the patient's ED course, the patient was given:  Medications   lidocaine 4 % external patch 1 patch (1 patch TransDERmal Patch Applied 11/6/21 1546)   HYDROmorphone (DILAUDID) injection 0.5 mg (0.5 mg IntraVENous Given 11/6/21 1718)   ketorolac (TORADOL) injection 30 mg (30 mg IntraVENous Given 11/6/21 1546)        CLINICAL IMPRESSION  1. Closed fracture of multiple ribs of right side, initial encounter    2. Hemopneumothorax on right    3. Fall, initial encounter        Blood pressure (!) 143/73, pulse 73, temperature 98.2 °F (36.8 °C), temperature source Oral, resp. rate 16, height 5' 3\" (1.6 m), weight 188 lb (85.3 kg), SpO2 95 %. DISPOSITION  Jaqueline Pierce was transferred to [facility] ED in stable condition. DISCLAIMER: This chart was created using Dragon dictation software. Efforts were made by me to ensure accuracy, however some errors may be present due to limitations of this technology and occasionally words are not transcribed correctly.         Colleen Barrientos MD  11/06/21 1957

## 2021-11-06 NOTE — ED NOTES
Report given to EMS. Report called to Aiden Martínez RN at [facility] ED. Pt left via stretcher with EMS. VSS. No further needs.       Alex Acevedo RN  11/06/21 1778

## 2021-11-07 LAB
EKG ATRIAL RATE: 78 BPM
EKG DIAGNOSIS: NORMAL
EKG P AXIS: 75 DEGREES
EKG P-R INTERVAL: 128 MS
EKG Q-T INTERVAL: 392 MS
EKG QRS DURATION: 82 MS
EKG QTC CALCULATION (BAZETT): 446 MS
EKG R AXIS: 5 DEGREES
EKG T AXIS: 5 DEGREES
EKG VENTRICULAR RATE: 78 BPM

## 2021-11-07 PROCEDURE — 93010 ELECTROCARDIOGRAM REPORT: CPT | Performed by: INTERNAL MEDICINE

## 2022-01-06 ENCOUNTER — APPOINTMENT (OUTPATIENT)
Dept: CT IMAGING | Age: 62
End: 2022-01-06
Payer: MEDICARE

## 2022-01-06 ENCOUNTER — HOSPITAL ENCOUNTER (EMERGENCY)
Age: 62
Discharge: HOME | End: 2022-01-07
Attending: EMERGENCY MEDICINE
Payer: MEDICARE

## 2022-01-06 ENCOUNTER — APPOINTMENT (OUTPATIENT)
Dept: GENERAL RADIOLOGY | Age: 62
End: 2022-01-06
Payer: MEDICARE

## 2022-01-06 DIAGNOSIS — M50.30 DDD (DEGENERATIVE DISC DISEASE), CERVICAL: ICD-10-CM

## 2022-01-06 DIAGNOSIS — K86.2 PANCREAS CYST: ICD-10-CM
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
## 2022-01-06 DIAGNOSIS — E04.1 THYROID NODULE: ICD-10-CM

## 2022-01-06 DIAGNOSIS — S16.1XXA CERVICAL STRAIN, ACUTE, INITIAL ENCOUNTER: ICD-10-CM

## 2022-01-06 DIAGNOSIS — S09.90XA CLOSED HEAD INJURY, INITIAL ENCOUNTER: ICD-10-CM

## 2022-01-06 DIAGNOSIS — S22.41XA CLOSED FRACTURE OF MULTIPLE RIBS OF RIGHT SIDE, INITIAL ENCOUNTER: Primary | ICD-10-CM

## 2022-01-06 LAB
A/G RATIO: 1.2 (ref 1.1–2.2)
ALBUMIN SERPL-MCNC: 3.9 G/DL (ref 3.4–5)
ALP BLD-CCNC: 81 U/L (ref 40–129)
ALT SERPL-CCNC: 17 U/L (ref 10–40)
ANION GAP SERPL CALCULATED.3IONS-SCNC: 12 MMOL/L (ref 3–16)
AST SERPL-CCNC: 15 U/L (ref 15–37)
BACTERIA: ABNORMAL /HPF
BASOPHILS ABSOLUTE: 0 K/UL (ref 0–0.2)
BASOPHILS RELATIVE PERCENT: 0.5 %
BILIRUB SERPL-MCNC: 0.3 MG/DL (ref 0–1)
BILIRUBIN URINE: NEGATIVE
BLOOD, URINE: NEGATIVE
BUN BLDV-MCNC: 13 MG/DL (ref 7–20)
CALCIUM SERPL-MCNC: 9 MG/DL (ref 8.3–10.6)
CHLORIDE BLD-SCNC: 102 MMOL/L (ref 99–110)
CLARITY: CLEAR
CO2: 25 MMOL/L (ref 21–32)
COLOR: YELLOW
CREAT SERPL-MCNC: 0.6 MG/DL (ref 0.6–1.2)
EOSINOPHILS ABSOLUTE: 0.1 K/UL (ref 0–0.6)
EOSINOPHILS RELATIVE PERCENT: 1.9 %
EPITHELIAL CELLS, UA: ABNORMAL /HPF (ref 0–5)
GFR AFRICAN AMERICAN: >60
GFR NON-AFRICAN AMERICAN: >60
GLUCOSE BLD-MCNC: 124 MG/DL (ref 70–99)
GLUCOSE URINE: NEGATIVE MG/DL
HCT VFR BLD CALC: 36.8 % (ref 36–48)
HEMOGLOBIN: 12 G/DL (ref 12–16)
KETONES, URINE: NEGATIVE MG/DL
LEUKOCYTE ESTERASE, URINE: ABNORMAL
LYMPHOCYTES ABSOLUTE: 1.6 K/UL (ref 1–5.1)
LYMPHOCYTES RELATIVE PERCENT: 32.3 %
MCH RBC QN AUTO: 29.6 PG (ref 26–34)
MCHC RBC AUTO-ENTMCNC: 32.6 G/DL (ref 31–36)
MCV RBC AUTO: 90.8 FL (ref 80–100)
MICROSCOPIC EXAMINATION: YES
MONOCYTES ABSOLUTE: 0.4 K/UL (ref 0–1.3)
MONOCYTES RELATIVE PERCENT: 8.4 %
NEUTROPHILS ABSOLUTE: 2.8 K/UL (ref 1.7–7.7)
NEUTROPHILS RELATIVE PERCENT: 56.9 %
NITRITE, URINE: NEGATIVE
PDW BLD-RTO: 14.2 % (ref 12.4–15.4)
PH UA: 6 (ref 5–8)
PLATELET # BLD: 185 K/UL (ref 135–450)
PMV BLD AUTO: 7.2 FL (ref 5–10.5)
POTASSIUM REFLEX MAGNESIUM: 3.8 MMOL/L (ref 3.5–5.1)
PROTEIN UA: NEGATIVE MG/DL
RBC # BLD: 4.05 M/UL (ref 4–5.2)
RBC UA: ABNORMAL /HPF (ref 0–4)
SODIUM BLD-SCNC: 139 MMOL/L (ref 136–145)
SPECIFIC GRAVITY UA: 1.02 (ref 1–1.03)
TOTAL PROTEIN: 7.1 G/DL (ref 6.4–8.2)
TROPONIN: <0.01 NG/ML
URINE REFLEX TO CULTURE: ABNORMAL
URINE TYPE: ABNORMAL
UROBILINOGEN, URINE: 0.2 E.U./DL
WBC # BLD: 5 K/UL (ref 4–11)
WBC UA: ABNORMAL /HPF (ref 0–5)

## 2022-01-06 PROCEDURE — 3209999900 CT LUMBAR SPINE TRAUMA RECONSTRUCTION

## 2022-01-06 PROCEDURE — 81001 URINALYSIS AUTO W/SCOPE: CPT

## 2022-01-06 PROCEDURE — 70450 CT HEAD/BRAIN W/O DYE: CPT

## 2022-01-06 PROCEDURE — 84484 ASSAY OF TROPONIN QUANT: CPT

## 2022-01-06 PROCEDURE — 72125 CT NECK SPINE W/O DYE: CPT

## 2022-01-06 PROCEDURE — 80053 COMPREHEN METABOLIC PANEL: CPT

## 2022-01-06 PROCEDURE — 73521 X-RAY EXAM HIPS BI 2 VIEWS: CPT

## 2022-01-06 PROCEDURE — 6370000000 HC RX 637 (ALT 250 FOR IP): Performed by: PHYSICIAN ASSISTANT

## 2022-01-06 PROCEDURE — 3209999900 CT THORACIC SPINE TRAUMA RECONSTRUCTION

## 2022-01-06 PROCEDURE — 93005 ELECTROCARDIOGRAM TRACING: CPT | Performed by: PHYSICIAN ASSISTANT

## 2022-01-06 PROCEDURE — 99283 EMERGENCY DEPT VISIT LOW MDM: CPT

## 2022-01-06 PROCEDURE — 85025 COMPLETE CBC W/AUTO DIFF WBC: CPT

## 2022-01-06 PROCEDURE — 6360000004 HC RX CONTRAST MEDICATION: Performed by: STUDENT IN AN ORGANIZED HEALTH CARE EDUCATION/TRAINING PROGRAM

## 2022-01-06 PROCEDURE — 71260 CT THORAX DX C+: CPT

## 2022-01-06 RX ORDER — ACETAMINOPHEN 500 MG
500 TABLET ORAL ONCE
Status: COMPLETED | OUTPATIENT
Start: 2022-01-06 | End: 2022-01-06

## 2022-01-06 RX ADMIN — IOPAMIDOL 75 ML: 755 INJECTION, SOLUTION INTRAVENOUS at 23:46

## 2022-01-06 RX ADMIN — ACETAMINOPHEN 500 MG: 500 TABLET ORAL at 22:23

## 2022-01-06 ASSESSMENT — PAIN DESCRIPTION - FREQUENCY: FREQUENCY: CONTINUOUS

## 2022-01-06 ASSESSMENT — PAIN SCALES - GENERAL
PAINLEVEL_OUTOF10: 10
PAINLEVEL_OUTOF10: 8

## 2022-01-06 ASSESSMENT — PAIN DESCRIPTION - ORIENTATION: ORIENTATION: RIGHT

## 2022-01-06 ASSESSMENT — PAIN DESCRIPTION - LOCATION: LOCATION: HIP

## 2022-01-06 ASSESSMENT — PAIN DESCRIPTION - DESCRIPTORS: DESCRIPTORS: SORE

## 2022-01-06 ASSESSMENT — PAIN DESCRIPTION - ONSET: ONSET: SUDDEN

## 2022-01-07 VITALS
TEMPERATURE: 98 F | DIASTOLIC BLOOD PRESSURE: 69 MMHG | RESPIRATION RATE: 16 BRPM | HEART RATE: 81 BPM | OXYGEN SATURATION: 93 % | SYSTOLIC BLOOD PRESSURE: 119 MMHG

## 2022-01-07 LAB
EKG ATRIAL RATE: 74 BPM
EKG DIAGNOSIS: NORMAL
EKG P AXIS: 39 DEGREES
EKG P-R INTERVAL: 150 MS
EKG Q-T INTERVAL: 396 MS
EKG QRS DURATION: 80 MS
EKG QTC CALCULATION (BAZETT): 439 MS
EKG R AXIS: 25 DEGREES
EKG T AXIS: 17 DEGREES
EKG VENTRICULAR RATE: 74 BPM

## 2022-01-07 PROCEDURE — 93010 ELECTROCARDIOGRAM REPORT: CPT | Performed by: INTERNAL MEDICINE

## 2022-01-07 RX ORDER — LIDOCAINE 4 G/G
1 PATCH TOPICAL DAILY
Status: DISCONTINUED | OUTPATIENT
Start: 2022-01-07 | End: 2022-01-07

## 2022-01-07 RX ORDER — LIDOCAINE 50 MG/G
1 PATCH TOPICAL DAILY
Qty: 30 PATCH | Refills: 0 | Status: SHIPPED | OUTPATIENT
Start: 2022-01-07

## 2022-01-07 ASSESSMENT — ENCOUNTER SYMPTOMS
VOMITING: 0
BACK PAIN: 0
ABDOMINAL PAIN: 0
SHORTNESS OF BREATH: 0

## 2022-01-07 NOTE — ED PROVIDER NOTES
[address] ED  EMERGENCY DEPARTMENT ENCOUNTER        Pt Name: Taina Arredondo  MRN: [id number]  [place] 1960  Date of evaluation: 1/6/2022  Provider: Jose Antonio Mark PA-C  PCP: Justin Dhaliwal MD    Shared Visit or Autonomous Visit:  I have seen and evaluated this patient with my supervising physician Salo Smart, [facility]       Chief Complaint   Patient presents with    Hip Pain     right side       HISTORY OF PRESENT ILLNESS   (Location/Symptom, Timing/Onset, Context/Setting, Quality, Duration, Modifying Factors, Severity)  Note limiting factors. Taina Arredondo is a 59 y.o. female presenting to the emergency department for evaluation after fall. Patient stating she came out of the bathroom was turning the light on states she tripped and fell landed on her right side pointing to her right flank. States she also hit her head. Has a headache. Her neck is sore. Patient has MR. She lives on her own at the group home. Spoke with  states fall was unwitnessed states they asked her multiple times what happened and kept getting different stories. she had a bad fall in November where she fractured multiple ribs and had a pneumothorax,  stated patient had told EMS that she hit her head when she fell. She has been ambulatory since the fall. She denies any back pain. The history is provided by the patient. The history is limited by a developmental delay. Fall  The fall occurred while walking. There was no blood loss. Point of impact: rt flank. The pain is present in the head and right hip (rt flank, rt ribs). Associated symptoms include headaches. Pertinent negatives include no fever, no numbness, no abdominal pain, no vomiting and no loss of consciousness. Nursing Notes were reviewed    REVIEW OF SYSTEMS    (2-9 systems for level 4, 10 or more for level 5)     Review of Systems   Constitutional: Negative for fever. Respiratory: Negative for shortness of breath. Cardiovascular: Negative for chest pain. Gastrointestinal: Negative for abdominal pain and vomiting. Genitourinary: Positive for flank pain. Negative for difficulty urinating and dysuria. Musculoskeletal: Positive for neck pain. Negative for back pain. Rt rib pain. Rt hip pain. Neurological: Positive for headaches. Negative for loss of consciousness, syncope and numbness. All other systems reviewed and are negative. Positives and Pertinent negatives as per HPI. PAST MEDICAL HISTORY     Past Medical History:   Diagnosis Date    Allergic rhinitis     Diabetes mellitus ([facility] [address]     Hypercholesteremia     Mental retardation     No history of procedure 2/29/16    no past colonoscopy         SURGICAL HISTORY     Past Surgical History:   Procedure Laterality Date    ANKLE SURGERY      COLONOSCOPY  01/10/2012    TUBAL LIGATION      UPPER GASTROINTESTINAL ENDOSCOPY  2/29/16         CURRENTMEDICATIONS       Previous Medications    AMMONIUM LACTATE (AMLACTIN) 12 % CREAM    Apply topically 2 times daily Apply twice daily to feet. ARIPIPRAZOLE (ABILIFY) 5 MG TABLET        ASPIRIN 81 MG EC TABLET    Take 81 mg by mouth daily    ATORVASTATIN (LIPITOR) 20 MG TABLET    Take 20 mg by mouth daily. BENZTROPINE (COGENTIN) 1 MG TABLET        CICLOPIROX (LOPROX) 0.77 % CREAM    Apply topically 2 times daily Apply topically 2 times daily. ESOMEPRAZOLE (NEXIUM) 40 MG DELAYED RELEASE CAPSULE    Take 40 mg by mouth every morning (before breakfast)    FEXOFENADINE (ALLEGRA) 180 MG TABLET    Take 180 mg by mouth daily. FLUOXETINE (PROZAC) 20 MG CAPSULE    Take 40 mg by mouth daily.     FLUTICASONE (FLONASE) 50 MCG/ACT NASAL SPRAY    1 spray by Each Nostril route daily    METFORMIN (GLUCOPHAGE) 500 MG TABLET    Take 500 mg by mouth daily 2 tablets daily    MONTELUKAST (SINGULAIR) 10 MG TABLET    Take 10 mg by mouth nightly    NABUMETONE (RELAFEN) 500 MG TABLET    Take 750 mg by mouth daily     NYSTATIN (MYCOSTATIN) 157103 UNIT/GM CREAM    Apply topically Three times per week    PROPYLTHIOURACIL (PTU) 50 MG TABLET    Take 50 mg by mouth 3 times daily     SUCRALFATE (CARAFATE) 1 GM/10ML SUSPENSION    Take 10 mLs by mouth 4 times daily for 7 days. THIORIDAZINE (MELLARIL) 25 MG TABLET    Take 25 mg by mouth Daily. Indications: HS         ALLERGIES     Patient has no known allergies. FAMILYHISTORY     History reviewed. No pertinent family history. SOCIAL HISTORY       Social History     Socioeconomic History    Marital status: Single     Spouse name: None    Number of children: None    Years of education: None    Highest education level: None   Occupational History    None   Tobacco Use    Smoking status: Never Smoker    Smokeless tobacco: Never Used   Substance and Sexual Activity    Alcohol use: No    Drug use: No    Sexual activity: Never   Other Topics Concern    None   Social History Narrative    None     Social Determinants of Health     Financial Resource Strain:     Difficulty of Paying Living Expenses: Not on file   Food Insecurity:     Worried About Running Out of Food in the Last Year: Not on file    Juancarlos of Food in the Last Year: Not on file   Transportation Needs:     Lack of Transportation (Medical): Not on file    Lack of Transportation (Non-Medical):  Not on file   Physical Activity:     Days of Exercise per Week: Not on file    Minutes of Exercise per Session: Not on file   Stress:     Feeling of Stress : Not on file   Social Connections:     Frequency of Communication with Friends and Family: Not on file    Frequency of Social Gatherings with Friends and Family: Not on file    Attends Jainism Services: Not on file    Active Member of Clubs or Organizations: Not on file    Attends Club or Organization Meetings: Not on file    Marital Status: Not on file   Intimate Partner Violence:     Fear of Current or Ex-Partner: Not on file    Emotionally Abused: Not on file    Physically Abused: Not on file    Sexually Abused: Not on file   Housing Stability:     Unable to Pay for Housing in the Last Year: Not on file    Number of [place] in the Last Year: Not on file    Unstable Housing in the Last Year: Not on file       SCREENINGS             PHYSICAL EXAM    (up to 7 for level 4, 8 or more for level 5)     ED Triage Vitals [01/06/22 2030]   BP Temp Temp Source Pulse Resp SpO2 Height Weight   136/60 97.4 °F (36.3 °C) Oral 85 16 95 % -- --       Physical Exam  Vitals and nursing note reviewed. Constitutional:       Appearance: She is well-developed. She is not toxic-appearing. Comments: Baseline MR. BISHOP:      Head: Normocephalic. No raccoon eyes, Foss's sign or contusion. Right Ear: Tympanic membrane and ear canal normal. No hemotympanum. Left Ear: Tympanic membrane and ear canal normal. No hemotympanum. Mouth/Throat:      Mouth: Mucous membranes are moist.      Pharynx: Oropharynx is clear. No pharyngeal swelling, oropharyngeal exudate or posterior oropharyngeal erythema. Eyes:      Extraocular Movements: Extraocular movements intact. Conjunctiva/sclera: Conjunctivae normal.      Pupils: Pupils are equal, round, and reactive to light. Neck:      Vascular: No JVD. Cardiovascular:      Rate and Rhythm: Normal rate and regular rhythm. Pulses:           Radial pulses are 2+ on the right side and 2+ on the left side. Posterior tibial pulses are 2+ on the right side and 2+ on the left side. Pulmonary:      Effort: Pulmonary effort is normal. No respiratory distress. Breath sounds: Normal breath sounds. No stridor. No wheezing, rhonchi or rales. Chest:      Chest wall: Tenderness present. Abdominal:      General: Bowel sounds are normal. There is no distension. Palpations: Abdomen is soft. Abdomen is not rigid. There is no mass. Tenderness:  There is no abdominal tenderness. There is no guarding or rebound. Musculoskeletal:         General: Deformity present. Normal range of motion. Cervical back: Normal range of motion and neck supple. No tenderness. Thoracic back: No tenderness. Lumbar back: No tenderness. Right hip: Tenderness present. No deformity. Comments: Tenderness to palpation right flank and lateral right ribs. Skin:     General: Skin is warm and dry. Findings: No rash. Neurological:      Mental Status: She is alert and oriented to person, place, and time. Mental status is at baseline. GCS: GCS eye subscore is 4. GCS verbal subscore is 5. GCS motor subscore is 6. Cranial Nerves: No cranial nerve deficit. Sensory: Sensation is intact. No sensory deficit. Motor: Motor function is intact. No abnormal muscle tone.       Coordination: Coordination normal.   Psychiatric:         Behavior: Behavior normal.         DIAGNOSTIC RESULTS   LABS:    Labs Reviewed   COMPREHENSIVE METABOLIC PANEL W/ REFLEX TO MG FOR LOW K - Abnormal; Notable for the following components:       Result Value    Glucose 124 (*)     All other components within normal limits    Narrative:     Performed at:  [facility] [place],  [place]   Phone [phone]   URINE RT REFLEX TO CULTURE - Abnormal; Notable for the following components:    Leukocyte Esterase, Urine SMALL (*)     All other components within normal limits    Narrative:     Performed at:  [facility]) - [facility],  [place], [place]   Phone [phone]   MICROSCOPIC URINALYSIS - Abnormal; Notable for the following components:    Bacteria, UA 2+ (*)     All other components within normal limits    Narrative:     Performed at:  [facility],  [facility] [employer]   Phone [phone]   CBC WITH AUTO DIFFERENTIAL    Narrative: Urine Negative Negative    Ketones, Urine Negative Negative mg/dL    Specific Gravity, UA 1.020 1.005 - 1.030    Blood, Urine Negative Negative    pH, UA 6.0 5.0 - 8.0    Protein, UA Negative Negative mg/dL    Urobilinogen, Urine 0.2 <2.0 E.U./dL    Nitrite, Urine Negative Negative    Leukocyte Esterase, Urine SMALL (A) Negative    Microscopic Examination YES     Urine Type NotGiven     Urine Reflex to Culture Not Indicated    Microscopic Urinalysis   Result Value Ref Range    WBC, UA 0-2 0 - 5 /HPF    RBC, UA 0-2 0 - 4 /HPF    Epithelial Cells, UA 2-5 0 - 5 /HPF    Bacteria, UA 2+ (A) None Seen /HPF   EKG 12 Lead   Result Value Ref Range    Ventricular Rate 74 BPM    Atrial Rate 74 BPM    P-R Interval 150 ms    QRS Duration 80 ms    Q-T Interval 396 ms    QTc Calculation (Bazett) 439 ms    P Axis 39 degrees    R Axis 25 degrees    T Axis 17 degrees    Diagnosis       Normal sinus rhythmLow voltage QRSCannot rule out Anterior infarct , age undeterminedAbnormal ECGWhen compared with ECG of 06-NOV-2021 15:43,No significant change was found       All other labs were within normal range or not returned as of this dictation. EKG: All EKG's are interpreted by the Emergency Department Physician in the absence of a cardiologist.  Please see their note for interpretation of EKG. RADIOLOGY:   Non-plain film images such as CT, Ultrasound and MRI are read by the radiologist. [facility] radiographic images are visualized andpreliminarily interpreted by the  ED Provider with the below findings:        Interpretation pert Radiologist below, if available at the time of this note:    CT CHEST ABDOMEN PELVIS W CONTRAST   Preliminary Result   Chest:      Multiple posterolateral right rib fractures in varying stages of healing,   suggesting multiple injuries at different times. Displaced fractures of the   8th and 9th ribs appear acute, with adjacent pleural thickening.       Otherwise, no acute CT abnormality of the chest. Heterogeneous thyroid gland with multiple hypoattenuating nodules, some which   are calcified. Further evaluation with nonemergent thyroid ultrasound is   recommended. Abdomen/pelvis:      No acute CT abnormality in the abdomen or pelvis. Hypoattenuating lesion in the pancreatic body measuring up to 10 mm, likely   representing a pancreatic cystic lesion. Re-evaluation with CT of the   abdomen and pelvis in 1 year is recommended. Spine:      No acute fracture or traumatic malalignment of the thoracic or lumbar spine. RECOMMENDATIONS:   Nonemergent thyroid ultrasound for further evaluation of multiple thyroid   nodules      Repeat CT of the abdomen and pelvis in 1 year for re-evaluation of pancreatic   cystic lesion         CT LUMBAR SPINE TRAUMA RECONSTRUCTION   Preliminary Result   Chest:      Multiple posterolateral right rib fractures in varying stages of healing,   suggesting multiple injuries at different times. Displaced fractures of the   8th and 9th ribs appear acute, with adjacent pleural thickening. Otherwise, no acute CT abnormality of the chest.      Heterogeneous thyroid gland with multiple hypoattenuating nodules, some which   are calcified. Further evaluation with nonemergent thyroid ultrasound is   recommended. Abdomen/pelvis:      No acute CT abnormality in the abdomen or pelvis. Hypoattenuating lesion in the pancreatic body measuring up to 10 mm, likely   representing a pancreatic cystic lesion. Re-evaluation with CT of the   abdomen and pelvis in 1 year is recommended. Spine:      No acute fracture or traumatic malalignment of the thoracic or lumbar spine.       RECOMMENDATIONS:   Nonemergent thyroid ultrasound for further evaluation of multiple thyroid   nodules      Repeat CT of the abdomen and pelvis in 1 year for re-evaluation of pancreatic   cystic lesion         CT THORACIC SPINE TRAUMA RECONSTRUCTION   Preliminary Result   Chest:      Multiple posterolateral right rib fractures in varying stages of healing,   suggesting multiple injuries at different times. Displaced fractures of the   8th and 9th ribs appear acute, with adjacent pleural thickening. Otherwise, no acute CT abnormality of the chest.      Heterogeneous thyroid gland with multiple hypoattenuating nodules, some which   are calcified. Further evaluation with nonemergent thyroid ultrasound is   recommended. Abdomen/pelvis:      No acute CT abnormality in the abdomen or pelvis. Hypoattenuating lesion in the pancreatic body measuring up to 10 mm, likely   representing a pancreatic cystic lesion. Re-evaluation with CT of the   abdomen and pelvis in 1 year is recommended. Spine:      No acute fracture or traumatic malalignment of the thoracic or lumbar spine. RECOMMENDATIONS:   Nonemergent thyroid ultrasound for further evaluation of multiple thyroid   nodules      Repeat CT of the abdomen and pelvis in 1 year for re-evaluation of pancreatic   cystic lesion         CT CERVICAL SPINE WO CONTRAST   Final Result   No acute intracranial abnormality. Negative acute fracture traumatic malalignment of the cervical spine. Multilevel degenerate changes cervical spine with prominent disc osteophyte   complex mission causing least moderate severe canal narrowing C5-6. Right thyroid nodule. Consider thyroid ultrasound this may change patient   management. RECOMMENDATIONS:   Unavailable         CT HEAD WO CONTRAST   Final Result   No acute intracranial abnormality. Negative acute fracture traumatic malalignment of the cervical spine. Multilevel degenerate changes cervical spine with prominent disc osteophyte   complex mission causing least moderate severe canal narrowing C5-6. Right thyroid nodule. Consider thyroid ultrasound this may change patient   management.       RECOMMENDATIONS:   Unavailable         XR HIP BILATERAL W AP PELVIS (2 VIEWS) Final Result      No acute findings in the bilateral hips. PROCEDURES   Unless otherwise noted below, none     Procedures    CRITICAL CARE TIME   N/A    CONSULTS:  None      EMERGENCY DEPARTMENT COURSE and DIFFERENTIAL DIAGNOSIS/MDM:   Vitals:    Vitals:    01/06/22 2030 01/06/22 2223   BP: 136/60 104/71   Pulse: 85 74   Resp: 16 16   Temp: 97.4 °F (36.3 °C)    TempSrc: Oral    SpO2: 95% 97%       Patient was given thefollowing medications:  Medications   lidocaine 4 % external patch 1 patch (has no administration in time range)   acetaminophen (TYLENOL) tablet 500 mg (500 mg Oral Given 1/6/22 2223)   iopamidol (ISOVUE-370) 76 % injection 75 mL (75 mLs IntraVENous Given 1/6/22 2346)         ED course  Patient presented to the ER for evaluation after falling. Complaining of pain to her right flank and headache. States she tripped at home that is what made her fall. Denied having chest pain or feeling dizzy no syncope. She fell a couple of months ago and had multiple rib fractures and pneumothorax. She is having difficulty breathing. Normal respirations. Oxygen saturation 97% on room air. Work-up was obtained. CT brain no acute intracranial modality. Cervical spine degenerative changes, no fracture. CT chest abdomen pelvis multiple rib fractures of varying ages but eighth and ninth rib fractures appear new. No pneumothorax. No acute traumatic finding in the abdomen. No spine fracture. No hip fracture. Patient reevaluated. Stable. Discussed results with her. Appropriate for discharge back to group home. She will be given incentive spirometer. Continue Tylenol for pain. Lidoderm patches. Close follow-up with her doctor. Return for worsening.       I estimate there is LOW risk for CAUDA EQUINA or CENTRAL CORD SYNDROME, COMPARTMENT SYNDROME, CORD COMPRESSION,  INTRACRANIAL HEMORRHAGE OR EDEMA, INTRA-ABDOMINAL INJURY, PERFORATED BOWEL, SUBDURAL OR EPIDURAL HEMATOMA, TENDON or NEUROVASCULAR INJURY, PNEUMOTHORAX, HEMOTHORAX, PERICARDIAL TAMPONADE, CARDIAC CONTUSION, or a THORACIC AORTIC DISSECTION, thus I consider the discharge disposition reasonable. Also, there is no evidence or peritonitis, sepsis, or toxicity. FINAL IMPRESSION      1. Closed fracture of multiple ribs of right side, initial encounter    2. Fall on same level from tripping    3. Closed head injury, initial encounter    4. Cervical strain, acute, initial encounter    5. DDD (degenerative disc disease), cervical    6. Thyroid nodule    7. Pancreas cyst          DISPOSITION/PLAN   DISPOSITION     PATIENT REFERREDTO:  Rebeca Montaño MD  [address] [facility] [address]  [address] [address] [facility]  [phone]    Call in 1 day  Call to arrange follow-up appointment from [address] visit    [facility] ED  [address] [facility]  [phone]    If symptoms worsen      DISCHARGE MEDICATIONS:  New Prescriptions    LIDOCAINE (LIDODERM) 5 %    Place 1 patch onto the skin daily 12 hours on, 12 hours off.        DISCONTINUED MEDICATIONS:  Discontinued Medications    No medications on file              (Please note that portions ofthis note were completed with a voice recognition program.  Efforts were made to edit the dictations but occasionally words are mis-transcribed.)    Abbi Olivier PA-C (electronically signed)           Lynne Weber PA-C  01/07/22 0107

## 2022-01-07 NOTE — ED NOTES
Discharged per order. Discharge instructions reviewed with pt and all questions answered.      Jason Plunkett RN  01/07/22 0904

## 2022-01-07 NOTE — ED TRIAGE NOTES
Pt presents with EMS after a subjective mechanical fall complaining of right hip pain. Per EMS pt was ambulating on scene upon their arrival, pt denies and LOC. No obvious injury/deformity.

## 2022-01-07 NOTE — ED PROVIDER NOTES
I independently examined and evaluated Joaquim Nova. All diagnostic, treatment, and disposition decisions were made by myself in conjunction with the advanced practice provider. For all further details of the patient's emergency department visit, please see the advanced practice provider's documentation. I personally saw the patient and performed a substantive portion of the visit including all aspects of the medical decision making      Primary Care Physician: Marybeth Gilford, MD    History: This is a 59 y.o. female who presents to the Emergency Department with complaint of history of baseline developmental delay, reports mechanical fall fell down the right side. Does of note have prior history of rib fractures with small pneumothorax. Mainly complaining of pain over the lateral lower ribs on right and right side abdomen without obvious signs of injury. Due to her being a poor historian with unwitnessed fall will obtain further imaging. MDM poor historian due to her baseline mental status. No other obvious signs of trauma. She is at her mental status baseline. Reportedly did hit head. Will obtain CT trauma imaging, if work-up is reassuring here likely we will discharge her back home. Patient was noted to have right-sided rib fractures without underlying pneumothorax or pulmonary contusion. She is not requiring increased oxygen shock, pain is well controlled at this time, do feel patient is otherwise stable for discharge back to facility. Physical:     oral temperature is 97.4 °F (36.3 °C). Her blood pressure is 136/60 and her pulse is 85.  Her respiration is 16 and oxygen saturation is 95%.    59 y.o. female   Awake alert following commands x4 extremities  Abdomen soft, right-sided abdominal pain, right-sided lower rib pain without crepitus  Lung fields symmetric    Impression: Fall    Plan: CT imaging    EKG Interpretation    The Ekg interpreted by me shows  normal sinus rhythm with a rate of 74  Axis is   Normal  QTc is  normal  Intervals and Durations are unremarkable. ST Segments: nonspecific changes    CRITICAL CARE: There was a high probability of clinically significant/life threatening deterioration in this patient's condition which required my urgent intervention. Total critical care time was 0 minutes. This excludes any time for separately reportable procedures. Marysol Easley DO  Emergency Physician        Comment: Please note this report has been produced using speech recognition software and may contain errors related to that system including errors in grammar, punctuation, and spelling, as well as words and phrases that may be inappropriate. If there are any questions or concerns please feel free to contact the dictating provider for clarification.           Marysol Easley,   01/06/22 [address],   01/07/22 7333

## 2022-02-22 ENCOUNTER — HOSPITAL ENCOUNTER (OUTPATIENT)
Dept: ULTRASOUND IMAGING | Age: 62
Discharge: HOME | End: 2022-02-22
Payer: MEDICARE

## 2022-02-22 DIAGNOSIS — R94.6 ABNORMAL THYROID EXAM: ICD-10-CM

## 2022-02-22 PROCEDURE — 76536 US EXAM OF HEAD AND NECK: CPT

## 2022-03-08 ENCOUNTER — OFFICE VISIT (OUTPATIENT)
Dept: ENT CLINIC | Age: 62
End: 2022-03-08
Payer: MEDICARE

## 2022-03-08 VITALS
SYSTOLIC BLOOD PRESSURE: 109 MMHG | HEIGHT: 63 IN | WEIGHT: 180 LBS | BODY MASS INDEX: 31.89 KG/M2 | TEMPERATURE: 97.8 F | DIASTOLIC BLOOD PRESSURE: 60 MMHG | HEART RATE: 79 BPM

## 2022-03-08 DIAGNOSIS — E04.2 MULTINODULAR THYROID: Primary | ICD-10-CM

## 2022-03-08 DIAGNOSIS — R05.9 COUGH IN ADULT: ICD-10-CM

## 2022-03-08 DIAGNOSIS — Z97.4 USES HEARING AID: ICD-10-CM

## 2022-03-08 DIAGNOSIS — H61.21 IMPACTED CERUMEN, RIGHT EAR: ICD-10-CM

## 2022-03-08 PROCEDURE — 3017F COLORECTAL CA SCREEN DOC REV: CPT | Performed by: OTOLARYNGOLOGY

## 2022-03-08 PROCEDURE — G8417 CALC BMI ABV UP PARAM F/U: HCPCS | Performed by: OTOLARYNGOLOGY

## 2022-03-08 PROCEDURE — G8427 DOCREV CUR MEDS BY ELIG CLIN: HCPCS | Performed by: OTOLARYNGOLOGY

## 2022-03-08 PROCEDURE — G8484 FLU IMMUNIZE NO ADMIN: HCPCS | Performed by: OTOLARYNGOLOGY

## 2022-03-08 PROCEDURE — 99203 OFFICE O/P NEW LOW 30 MIN: CPT | Performed by: OTOLARYNGOLOGY

## 2022-03-08 PROCEDURE — 10006 FNA BX W/US GDN EA ADDL: CPT | Performed by: OTOLARYNGOLOGY

## 2022-03-08 PROCEDURE — 69210 REMOVE IMPACTED EAR WAX UNI: CPT | Performed by: OTOLARYNGOLOGY

## 2022-03-08 PROCEDURE — 10005 FNA BX W/US GDN 1ST LES: CPT | Performed by: OTOLARYNGOLOGY

## 2022-03-08 PROCEDURE — 1036F TOBACCO NON-USER: CPT | Performed by: OTOLARYNGOLOGY

## 2022-03-08 ASSESSMENT — ENCOUNTER SYMPTOMS
SHORTNESS OF BREATH: 0
ABDOMINAL PAIN: 0
COUGH: 1
WHEEZING: 0

## 2022-03-11 ENCOUNTER — TELEPHONE (OUTPATIENT)
Dept: OTOLARYNGOLOGY | Age: 62
End: 2022-03-11

## 2022-03-11 NOTE — TELEPHONE ENCOUNTER
Called patient left voicemail - if she calls back please notify her that the biopsy of the right thyroid nodule was benign (no cancer noted), the left nodule was nondiagnostic (we did not get enough tissue). Please ask her if she would like to get the left nodule biopsied again, and if so would she like it to go through our office or interventional radiology. FINAL DIAGNOSIS:     A. Thyroid, right, fine needle aspiration:        -  Benign        -  Benign appearing follicular cells with background blood and   colloid     B.  Thyroid, left, fine needle aspiration:        -  Non-diagnostic        -  Insufficient follicular cells present (<6 groups of 10 well   stained/preserved cells)

## 2022-04-19 ENCOUNTER — OFFICE VISIT (OUTPATIENT)
Dept: ENT CLINIC | Age: 62
End: 2022-04-19
Payer: MEDICARE

## 2022-04-19 VITALS — TEMPERATURE: 98.2 F | HEIGHT: 63 IN | BODY MASS INDEX: 31.89 KG/M2 | WEIGHT: 180 LBS

## 2022-04-19 DIAGNOSIS — E04.2 MULTINODULAR THYROID: Primary | ICD-10-CM

## 2022-04-19 PROCEDURE — 99211 OFF/OP EST MAY X REQ PHY/QHP: CPT | Performed by: OTOLARYNGOLOGY

## 2022-04-19 PROCEDURE — G8417 CALC BMI ABV UP PARAM F/U: HCPCS | Performed by: OTOLARYNGOLOGY

## 2022-04-19 PROCEDURE — G8427 DOCREV CUR MEDS BY ELIG CLIN: HCPCS | Performed by: OTOLARYNGOLOGY

## 2022-04-19 ASSESSMENT — ENCOUNTER SYMPTOMS
ABDOMINAL PAIN: 0
COUGH: 1
WHEEZING: 0
SHORTNESS OF BREATH: 0

## 2022-04-19 NOTE — PROGRESS NOTES
[facility], [address], [address], [address] [place] [address] Brad  P: [phone]       Patient     Rosalinda Tanner  1960    Chief Concern     Chief Complaint   Patient presents with    Follow-up     patient is here for a 6 week follow up from her FNA. Assessment and Plan      Diagnosis Orders   1. Multinodular thyroid       Patient with bilateral thyroid nodules, normal TSH 10/2010 - both nodules biopsied under ultrasound guidance in clinic; right returned benign, left nondiagnostic. We discussed observation with serial ultrasounds versus repeat biopsy of the left thyroid nodule, and she stated in favor of the latter. We will set her up for a procedure visit with plan for fine-needle aspiration biopsy of the left thyroid nodule under ultrasound guidance. No follow-ups on file. History of Present Illness     Patient referred for bilateral thyroid nodules - right 2.9mm, left 11mm, both TI-RADS category 4. Has history of MR/DD, wears BTE hearing aids - history difficult however history of palpitations and nausea (see ED visit 01/2020) - last TSH 10/2010, currently follows up with Dr. Erinn Vargas at [place] [address]. Caretaker states concern for intermittent cough - dry in nature, ongoing for approximately 6 months however no dysphagia, coughing/choking with foods. On flonase however not taking. Interval history 4/19/2022: Pathology returned right thyroid nodule benign, left nondiagnostic    Past Medical History     Past Medical History:   Diagnosis Date    Allergic rhinitis     Diabetes mellitus ([address]     Hypercholesteremia     Mental retardation     No history of procedure 2/29/16    no past colonoscopy       Past Surgical History     Past Surgical History:   Procedure Laterality Date    ANKLE SURGERY      COLONOSCOPY  01/10/2012    TUBAL LIGATION      UPPER GASTROINTESTINAL ENDOSCOPY  2/29/16       Family History     No family history on file.     Social History     Social History     Tobacco Use    Smoking status: Never Smoker    Smokeless tobacco: Never Used   Vaping Use    Vaping Use: Never used   Substance Use Topics    Alcohol use: No    Drug use: No        Allergies     No Known Allergies    Medications     Current Outpatient Medications   Medication Sig Dispense Refill    lidocaine (LIDODERM) 5 % Place 1 patch onto the skin daily 12 hours on, 12 hours off. 30 patch 0    ARIPiprazole (ABILIFY) 5 MG tablet       benztropine (COGENTIN) 1 MG tablet       aspirin 81 MG EC tablet Take 81 mg by mouth daily      esomeprazole (NEXIUM) 40 MG delayed release capsule Take 40 mg by mouth every morning (before breakfast)      fluticasone (FLONASE) 50 MCG/ACT nasal spray 1 spray by Each Nostril route daily      nystatin (MYCOSTATIN) 669507 UNIT/GM cream Apply topically Three times per week      montelukast (SINGULAIR) 10 MG tablet Take 10 mg by mouth nightly      ciclopirox (LOPROX) 0.77 % cream Apply topically 2 times daily Apply topically 2 times daily.  ammonium lactate (AMLACTIN) 12 % cream Apply topically 2 times daily Apply twice daily to feet.  metFORMIN (GLUCOPHAGE) 500 MG tablet Take 500 mg by mouth daily 2 tablets daily      nabumetone (RELAFEN) 500 MG tablet Take 750 mg by mouth daily       propylthiouracil (PTU) 50 MG tablet Take 50 mg by mouth 3 times daily       atorvastatin (LIPITOR) 20 MG tablet Take 20 mg by mouth daily.  fexofenadine (ALLEGRA) 180 MG tablet Take 180 mg by mouth daily.  fluoxetine (PROZAC) 20 MG capsule Take 40 mg by mouth daily.  thioridazine (MELLARIL) 25 MG tablet Take 25 mg by mouth Daily. Indications: HS      sucralfate (CARAFATE) 1 GM/10ML suspension Take 10 mLs by mouth 4 times daily for 7 days. 280 mL 0     No current facility-administered medications for this visit. Review of Systems     Review of Systems   Constitutional: Negative for activity change, chills, diaphoresis, fatigue and fever.    HENT: Positive for hearing loss. Negative for congestion. Eyes: Negative for visual disturbance. Respiratory: Positive for cough. Negative for shortness of breath and wheezing. Cardiovascular: Negative for chest pain. Gastrointestinal: Negative for abdominal pain. Musculoskeletal: Negative for neck pain and neck stiffness. Skin: Negative for rash. Neurological: Negative for dizziness, light-headedness and headaches. Hematological: Negative for adenopathy. PhysicalExam     Vitals:    04/19/22 1004   Temp: 98.2 °F (36.8 °C)   TempSrc: Infrared   Weight: 180 lb (81.6 kg)   Height: 5' 3\" (1.6 m)       Physical Exam  Vitals reviewed. Constitutional:       Appearance: Normal appearance. HENT:      Head: Normocephalic and atraumatic. Nose: No congestion or rhinorrhea. Mouth/Throat:      Lips: Pink. No lesions. Mouth: Mucous membranes are moist. No oral lesions. Tongue: No lesions. Tongue does not deviate from midline. Palate: No mass and lesions. Pharynx: Oropharynx is clear. Uvula midline. No oropharyngeal exudate or posterior oropharyngeal erythema. Eyes:      Extraocular Movements: Extraocular movements intact. Pupils: Pupils are equal, round, and reactive to light. Musculoskeletal:      Cervical back: Normal range of motion and neck supple. Lymphadenopathy:      Cervical: No cervical adenopathy. Neurological:      Mental Status: She is alert. Cranial Nerves: No cranial nerve deficit. Data Review        Procedure     Tania Brooks MD  4/19/22    Portions of this note were dictated using Dragon.  There may be linguistic errors secondary to the use of this program.

## 2022-04-20 ENCOUNTER — HOSPITAL ENCOUNTER (OUTPATIENT)
Dept: PHYSICAL THERAPY | Age: 62
Setting detail: THERAPIES SERIES
Discharge: HOME | End: 2022-04-20
Payer: MEDICARE

## 2022-04-20 PROCEDURE — 97161 PT EVAL LOW COMPLEX 20 MIN: CPT

## 2022-04-20 PROCEDURE — 97110 THERAPEUTIC EXERCISES: CPT

## 2022-04-20 NOTE — PROGRESS NOTES
[facility]  [address]     [place], [facility]  Phone: [phone]   Fax:   [phone]     Physical Therapy Certification  Dear Referring Practitioner: ROSA Hernandez    We had the pleasure of evaluating the following patient for physical therapy services at [address] [facility]. A summary of our findings can be found in the initial assessment below. This includes our plan of care. If you have any questions or concerns regarding these findings, please do not hesitate to contact me at the office phone number above.   Thank you for the referral.       Provider Signature:_______________________________Date:__________________  By signing above (or electronic signature), therapist's plan is approved by physician      Patient: Susy Black   : 1960   MRN: [id number]    Referring Physician: ROSA Hernandez        Evaluation Date: 2022         Medical Diagnosis Information:  Diagnosis: falls, sequela (W19.xxxS)   Treatment Diagnosis: decreased dynamic balance with mild BLE weakness affecting gait pattern and functional mobility                                         Insurance information: PT Insurance Information: medicare  + medicaid (BMN)      Precautions/ Contra-indications:   Red Flag Symptoms:   none  Safety awareness:decreased: caregiver reports patient needs cueing to attend to environment or obstacles at times  Other: [place] (requires some lip reading with mask down throughout session); MR (decreased command following ability), previous rib fractures with falls to R    Adhesive allergy: unknown  Latex Allergy:  unknown    Preferred Language for Healthcare:   [x]English       []other:    C-SSRS Triggered by Intake questionnaire (Past 2 wk assessment):   [x] No, Questionnaire did not trigger screening.   [] Yes, Patient intake triggered further evaluation      [] C-SSRS Screening completed  [] PCP notified via Plan of Care  [] Emergency services notified    Plan of care sent to provider:      [x]Faxed   []Co-signature   (attempts: 1[x] 4/20/22 2 []3[])         Relevant Medical History:   Past Medical History:   Diagnosis Date    Allergic rhinitis     Diabetes mellitus ([address]     Hypercholesteremia     Mental retardation     No history of procedure 2/29/16    no past colonoscopy     Past Surgical History:   Procedure Laterality Date    ANKLE SURGERY      COLONOSCOPY  01/10/2012    TUBAL LIGATION      UPPER GASTROINTESTINAL ENDOSCOPY  2/29/16          Functional Scale/Score:  Testing:   YES  Measure Used:  FOTO  Date Assessed:  4/20/2022  Score:    58/100 (FOTO)     Occupation/School: working for [employer] 3x/wk    Sport/recreational activities: walking, shopping    Patient goal for therapy:  \" Patient goals : less falling \"      SUBJECTIVE  History of Present Illness:    Per patient report 4/20/22:   Marti Kitchen falling in apartment: reports chair in way. Caregiver reports moving furniture around, however fell again approx 1.5 months later - unknown cause. Caregiver reports repeated falls occurring to R side. Genevia Plunk going into bathroom with possible trip over rug. All rugs have been removed. Has fallen approx. 3 times in last 6 months. Lives by self in own apartment at group home, staff available 8 hours a day. No witnessed falls, staff does not notice any imbalance, possibility of inattention to barriers in environment. Per ED note dated  1/6/22 KATRIN Zhu:   Nissa Bartlett is a 59 y.o. female presenting to the emergency department for evaluation after fall. Patient stating she came out of the bathroom was turning the light on states she tripped and fell landed on her right side pointing to her right flank. States she also hit her head. Has a headache. Her neck is sore. Patient has MR. She lives on her own at the group home.   Spoke with  states fall was unwitnessed states they asked her multiple times what happened and kept getting different stories. she had a bad fall in November where she fractured multiple ribs and had a pneumothorax,  stated patient had told EMS that she hit her head when she fell. She has been ambulatory since the fall. She denies any back pain. The history is provided by the patient. The history is limited by a developmental delay. \"    Per imaging completed in ED 1/6/22:  \"  Impression   Chest:       Multiple posterolateral right rib fractures in varying stages of healing,   suggesting multiple injuries at different times.  Displaced fractures of the   8th and 9th ribs appear acute, with adjacent pleural thickening.       Otherwise, no acute CT abnormality of the chest.       Heterogeneous thyroid gland with multiple hypoattenuating nodules, some of   which are calcified.  Further evaluation with nonemergent thyroid ultrasound   is recommended.       Abdomen/pelvis:       No acute CT abnormality in the abdomen or pelvis.       Hypoattenuating lesion in the pancreatic body measuring up to 10 mm, likely   representing a pancreatic cystic lesion.  Re-evaluation with CT of the   abdomen and pelvis in 1 year is recommended.       Spine:       No acute fracture or traumatic malalignment of the thoracic or lumbar spine. \"    Per chart review  Pt seeing Dr. Senthil Servin for f/u for thyroid nodules: biopsies competed with benign R nodule however repeat of L needed and scheduled for 5/17/22      Date of provider/MD f/u appt: 4/21/22    Social support/Environment:    Family/caregiver support:       YES  - 8 hours per day (available)    Home environment:   apartment   Steps to enter first floor:    No steps   Steps to second floor:  N/A  Bathroom:    [address] owned:   life alert    Baseline function/PLOF:  History of falls     Yes  Pt. Able to drive     No  Pt independent with ADLs  Yes   Pt.  Required assistance from family for: Cleaning, Cooking and Laundry     Pt. independent for transfers and gait and walked with No Device    Pain       Patient describes pain to be none    OBJECTIVE FINDINGS      Cognitive Status    A&O to   Person and Place  Able to follow:   1 step commands     Communication   Impaired  Comments: required repeated commands and some cueing    Cardiopulmonary Status   []NT  Orthostatic BP Assessment BP HR (bpm) Spo2 % Supplemental o2   supine NT NT NT NT   sitting 109/67 76 95 % RA   standing 116/69 84 94 % RA   Post-ambulation/activity NT NT 93-96%      Tone     WNL    Trunk Control      Fair    Vision:     wears glasses for distance    Hearing:     wears hearing aides  impaired    Gait Testing:     Gait   Level of Assistance needed:     Independent  Gait Deviations (firm surface/linoleum):     decreased salbador, Increased JAHAIRA, decreased head and trunk rotation, decreased arm swing bilaterally, decreased step height bilaterally and decreased step length bilaterally  Assistive Device Used:     no AD    Stairs  NA: pt does not need to negotiate stairs at home or in community    Sensation       Light touch:     WFL L LE and R LE  Proprioception:   Impaired L LE and R LE    Strength/ROM    [x]All ROM WNL except as marked below    [x]All strength measured on 5 point scale   \"*\" Indicates pain with movement  Movement Left Right []AROM  []PROM   Hip Flexion 4 4     Hip Extension 4 4     Hip Abduction 3+ 4-     Hip Adduction Not tested Not tested     Hip Internal Rotation Not tested Not tested     Hip External Rotation Not tested Not tested     Knee Flexion 4+ 4+     Knee Extension 4- 4-     Ankle Dorsiflexion 4- 4-     Ankle Plantarflexion 4- 3+     Ankle Inversion Not tested Not tested     Ankle Eversion Not tested Not tested     Great Toe extension Not tested Not tested      Comment: difficulty following commands throughout MMT    Reflexes  Assess NV    Coordination Testing:       Assess NV    Flexibility     Assess NV    Functional Mobility    Transitional Movement Assistance Level   Rolling to left side Independent   Rolling to right side Independent   Scooting in bed Independent   Supine to sit Independent   Sit to supine Independent   Sit to stand Independent   Stand to sit Independent   Bed to chair  Independent     Balance  Static Sitting:  Good   Dynamic Sitting: Good -   Static Standing:  Fair +  Dynamic Standing: Fair       [x] Patient history, allergies, meds reviewed. Medical chart reviewed. See intake form. Review Of Systems (ROS):  [x]Performed Review of systems (Integumentary, CardioPulmonary, Neurological) by intake and observation. Intake form has been scanned into medical record. Patient has been instructed to contact their primary care physician regarding ROS issues if not already being addressed at this time.         Co-morbidities/Complexities (which will affect course of rehabilitation):   []None           Arthritic conditions   []Rheumatoid arthritis (M05.9)  []Osteoarthritis (M19.91)   Cardiovascular conditions   []Hypertension (I10)  [x]Hyperlipidemia (E78.5)  []Angina pectoris (I20)  []Atherosclerosis (I70)   Musculoskeletal conditions   []Disc pathology   []Congenital spine pathologies   [x]Prior surgical intervention  []Osteoporosis (M81.8)  []Osteopenia (M85.8)   Endocrine conditions   []Hypothyroid (E03.9)  []Hyperthyroid Gastrointestinal conditions   []Constipation (V79.79)   Metabolic conditions   []Morbid obesity (E66.01)  []Diabetes type 1(E10.65) or 2 (E11.65)   []Neuropathy (G60.9)     Pulmonary conditions   []Asthma (J45)  []Coughing   []COPD (J44.9)   Psychological Disorders  []Anxiety (F41.9)  []Depression (F32.9)   []Other:   [x]Other:   Mental retardation, ankle sx         Barriers to/and or personal factors that will affect rehab potential:              Cognitive Function, education/learning barriers  Environmental, home barriers  Justification:     Falls Risk Assessment (30 days): [] Falls Risk assessed and no intervention required. [x] Falls Risk assessed and Patient requires intervention due to being higher risk   [x]Tinetti Balance: Total Score:     Tinetti Total Score: 16  Balance:    Balance Score: 11       Gait:     Gait Score: 5   Disability Index:   Tinetti Disability Index: 40-59%   Risk of Falls:   []Low (> 24)   []Mod (19-23)   [x]High (< 18)      [x]Timed Up and Go (TUG)    Assess NV  [x] Falls education provided, including: handout provided for fall risk reduction at home    ASSESSMENT: Pt is 58 yo Female presenting to OP PT clinic with medical Diagnosis: falls, sequela (W19.xxxS). Presents today with Treatment Diagnosis: decreased dynamic balance with mild BLE weakness affecting gait pattern and functional mobility. Would benefit from continued OP PT to address below impairments and restore function.      Functional Impairments:    Assistance required with functional mobility/gait for safety below baseline  Decreased core/proximal hip strength and neuromuscular control   Decreased UE/LE functional strength   Impaired balance/coordination/proprioceptive control     Functional Activity Limitations   Reduced ability to squat  Reduced ability to ambulate prolonged functional periods/distances/surfaces  Reduced ability to ascend/descend stairs  Reduced ability to self-correct for losses of balance     Participation Restrictions  Reduced participation in home management activities    Classification :   Signs/symptoms consistent with primary prevention/risk reduction for loss of balance and falls     Prognosis/Rehab Potential:    Good    Tolerance of evaluation/treatment:   Good     Physical Therapy Evaluation Complexity Justification   [x] A history of present problem with:  [] no personal factors and/or comorbidities that impact the plan of care;  []1-2 personal factors and/or comorbidities that impact the plan of care  [x]3 personal factors and/or comorbidities that impact the plan of care  [x] An examination of body systems using standardized tests and measures addressing any of the following: body structures and functions (impairments), activity limitations, and/or participation restrictions;:  [] a total of 1-2 or more elements   [x] a total of 3 or more elements   [] a total of 4 or more elements   [x] A clinical presentation with:  [x] stable and/or uncomplicated characteristics   [] evolving clinical presentation with changing characteristics  [] unstable and unpredictable characteristics;   [x] Clinical decision making of low complexity using standardized patient assessment instrument and/or measurable assessment of functional outcome. EVAL (LOW) [id number] (typically 20 minutes face-to-face)    PLAN: Begin PT focusing on:     assess adhesive and latex allergy; flexibility, coordination, reflexes     Initiate static and dynamic balance activities   Review HEP with progression: add bridges, squat at sink    Frequency/Duration:  1-2 days per week for 6 Weeks:  Interventions:  Therapeutic exercise including: strength and ROM for Upper extremity, Lower extremity and Core   Manual therapy as indicated for UE, LE and spine to include: Dry Needling/IASTM, STM, PROM, Gr I-IV mobilizations, manipulation. Neuromuscular re-education activation and proprioception for BLEs, balance, and coordination  Therapeutic activity that may include: bed mobility training, transfer training, ADL training, IADL training  Gait training  Home Management training of patient and/or caregiver  Modalities as needed that may include: thermal agents, E-stim, Biofeedback, US, iontophoresis as indicated  Patient education on joint protection, postural re-education, activity modification, progression of HEP. GOALS: Goals established 4/20/22   Patient stated goal: \" Patient goals : less falling \"   [] Progressing: [] Met: [] Not Met: [] Adjusted    Therapist goals for Patient:   Short Term Goals:  To be achieved in: 3 weeks   - Independent in HEP and progression per patient tolerance, in order to prevent re-injury. [] Progressing: [] Met: [] Not Met: [] Adjusted   - Caregiver and patient to make necessary changes to home environment based on handout provided  [] Progressing: [] Met: [] Not Met: [] Adjusted    Long Term Goals: To be achieved in: 6 weeks   - Pt to improve strength to 4+/5 or better to allow for proper functional mobility as indicated by patients Functional Deficits. [] Progressing: [] Met: [] Not Met: [] Adjusted   - Pt to demonstrate improved gait pattern including improved step length and salbador without cues with use of no AD  [] Progressing: [] Met: [] Not Met: [] Adjusted   - Pt to negotiate up/down 1 curb step with No Device Independent  [] Progressing: [] Met: [] Not Met: [] Adjusted   - Improve Tinetti score to 23 or better to facilitate improvement in movement, function, and ADLs as indicated by Functional Deficits.   [] Progressing: [] Met: [] Not Met: [] Adjusted   -Caregiver to report no falls since initiating therapy  [] Progressing: [] Met: [] Not Met: [] Adjusted      Electronically signed by:  Luis Christianson, PT, DPT, OMT-C #[id number]

## 2022-04-20 NOTE — FLOWSHEET NOTE
[facility]  [address]     [place], [facility]  Phone: [phone]   Fax:   [phone]      Physical Therapy   [x] Daily Treatment Note   [] Progress Note   [] Discharge Note  Date:  2022    Patient Name:  Ema Coates    :  1960  MRN: [id number]    Medical/Treatment Diagnosis Information:  Diagnosis: falls, sequela (W19.xxxS)  Treatment Diagnosis: decreased dynamic balance with mild BLE weakness affecting gait pattern and functional mobility    Insurance/Certification information:  PT Insurance Information: medicare  + medicaid (BMN)    Physician Information:  Referring Practitioner: ROSA Silva    Plan of care signed: [x]Faxed  []Co-signature    (attempts: 1[x] 22 2 []3[])         Plan of care signed:      []Yes date:            [x]No      Date of Patient follow up with Physician: 2022    Is this a Progress Report:     []  Yes  [x]  No      If Yes:  Date Range for reporting period:  Beginnin22  Ending    Progress report will be due (10 Rx or 30 days whichever is less): May 18 3221  Recertification will be due (POC Duration  / 90 days whichever is less):  12 visits or 2022  Next FOTO assessment due:        May 4 2022      Visit # Insurance Allowable Auth Required   - BMN []  Yes [x]  No        INITIAL EVALUATION:  Functional Scale: FOTO: 58/100     Date assessed:  22  (Predicted outcome score of: 59/100 within 11 visits per FOTO data system based on risk analysis)      RESTRICTIONS/PRECAUTIONS:   Precautions/ Contra-indications:   Red Flag Symptoms:   none  Safety awareness:decreased: caregiver reports patient needs cueing to attend to environment or obstacles at times  Other: [place] (requires some lip reading with mask down throughout session); MR (decreased command following ability), previous rib fractures with falls to R     Adhesive allergy: unknown  Latex Allergy:  unknown    Preferred Language for Healthcare:   [x]English       []other:    History of Present Illness  Per patient report 4/20/22:   Neville Pruitt falling in apartment: reports chair in way. Caregiver reports moving furniture around, however fell again approx 1.5 months later - unknown cause. Caregiver reports repeated falls occurring to R side. [employer] going into bathroom with possible trip over rug. All rugs have been removed. Has fallen approx. 3 times in last 6 months. Lives by self in own apartment at group home, staff available 8 hours a day. No witnessed falls, staff does not notice any imbalance, possibility of inattention to barriers in environment.       Per ED note dated  1/6/22 KATRIN Ortiz:   Aditi Batista is a 59 y. o. female presenting to the emergency department for evaluation after fall. Miranda Haskins stating she came out of the bathroom was turning the light on states she tripped and fell landed on her right side pointing to her right flank.  States she also hit her head.  Has a headache.  Her neck is sore.  Patient has MR.  She lives on her own at the group home.  Spoke with  states fall was unwitnessed states they asked her multiple times what happened and kept getting different stories. she had a bad fall in November where she fractured multiple ribs and had a pneumothorax,  stated patient had told EMS that she hit her head when she fell. Che Henriquez has been ambulatory since the fall.  She denies any back pain. The history is provided by the patient.  The history is limited by a developmental delay. \"     Per imaging completed in ED 1/6/22:  \"  Impression   Chest:       Multiple posterolateral right rib fractures in varying stages of healing,   suggesting multiple injuries at different times.  Displaced fractures of the   8th and 9th ribs appear acute, with adjacent pleural thickening.       Otherwise, no acute CT abnormality of the chest.       Heterogeneous thyroid gland with multiple hypoattenuating nodules, some of   which are calcified.  Further evaluation with nonemergent thyroid ultrasound   is recommended.       Abdomen/pelvis:       No acute CT abnormality in the abdomen or pelvis.       Hypoattenuating lesion in the pancreatic body measuring up to 10 mm, likely   representing a pancreatic cystic lesion.  Re-evaluation with CT of the   abdomen and pelvis in 1 year is recommended.       Spine:       No acute fracture or traumatic malalignment of the thoracic or lumbar spine. \"     Per chart review  Pt seeing Dr. Umesh Samson for f/u for thyroid nodules: biopsies competed with benign R nodule however repeat of L needed and scheduled for 5/17/22      Date of provider/MD f/u appt: 4/21/22     Plan Moving Forward:    · assess adhesive and latex allergy; flexibility, coordination, reflexes  ·   Initiate static and dynamic balance activities  · Review HEP with progression: add bridges, squat at sink       SUBJECTIVE:  See eval    Pain level:     AT EVAL: none    OBJECTIVE: See eval      Exercises/Interventions:     Exercises in bold performed in department today. Items not bolded are carried forward from prior visits for continuity of the record. Exercise/Equipment Resistance/Repetitions HEP Other comments     nustep NV []      Standing heel raises 3x10-15 [x]      Standing hip abd 3x10-15 [x]      Standing marching 3x10-15 [x]      bridges NV []      Squats at sink NV []        []        []        []          []         []        []      Neuromuscular re-ed  []        []        []        []        []        []      Therapeutic Exercise/Home Exercise Program:   23 minutes  HEP issued. Educated on fall risk reduction and POC for future visits. Pt verbalized understanding and all of patient's questions answered to satisfaction.     Therapeutic Activity:  0 minutes     Gait: 0 minutes    Neuromuscular Re-Education:  0 minutes    Manual Therapy:  0 minutes    Modalities: 0 minutes        Therapeutic Exercise and NMR EXR   [x] (44132) Provided verbal/tactile cueing for activities related to strengthening, flexibility, endurance, ROM  for improvements in proximal hip control, core control, knee control, ankle control, cervical alignment, thoracic alignment, lumbosacral alignment, shoulder control, elbow control and wrist control with self care, mobility, lifting and ambulation.  [] (84540) Provided verbal/tactile cueing for activities related to improving balance, coordination, kinesthetic sense, posture, motor skill, proprioception  to assist with core control in self care, mobility, lifting, and ambulation.      Therapeutic Activities:    [] (96284 or 08620) Provided verbal/tactile cueing for activities related to improving balance, coordination, kinesthetic sense, posture, motor skill, proprioception and motor activation to allow for proper function  with self care and ADLs  [] (77462) Provided training and instruction to the patient for proper core and proximal hip recruitment and positioning with ambulation re-education     Home Exercise Program:    [x] (68595) Reviewed/Progressed HEP activities related to strengthening, flexibility, endurance, ROM of core, proximal hip, LE, cervical spine, thoracic spine and UE for functional self-care, mobility, lifting and ambulation   [] (45453) Reviewed/Progressed HEP activities related to improving balance, coordination, kinesthetic sense, posture, motor skill, proprioception of core, proximal hip, LE, cervical spine, thoracic spine and UE for self care, mobility, lifting, and ambulation      Manual Treatments:  PROM / STM / Oscillations-Mobs:  G-I, II, III, IV (PA's, Inf., Post.)  [] (78240) Provided manual therapy to mobilize proximal hip, knee, ankle, shoulder, elbow, lumbosacral soft tissues/joints and cervicothoracic soft tissues/joints for the purpose of modulating pain, promoting relaxation,  increasing ROM, reducing/eliminating soft tissue swelling/inflammation/restriction, improving soft tissue extensibility and allowing for proper ROM for normal function with self care, mobility, lifting and ambulation. Modalities:     [] Ultrasound   [] Estim    [] Mechanical traction    [] Vaso-pneumatic device   [] Ionto     Charges:  Timed Code Treatment Minutes: 23   Total Treatment Minutes: 38     Medicare YTD amount: $150    [x] EVAL  [x] FY(17515) x 2   [] NMR (06852) x        [] Manual (44229) x       [] TA x     [] Gait Training x   [] WC Training x        [] ES(attended) (27077)        [] IONTO  [] VASO  [] Mech Traction (79330)  [] ES (un) (91149):   [] ZB  [] Other:      ASSESSMENT:  Good understanding of condition, deficits found, plan of care initiated and HEP to continue.     GOALS: Goals established 4/20/22   Patient stated goal: \" Patient goals : less falling \"   []? Progressing: []? Met: []? Not Met: []? Adjusted     Therapist goals for Patient:   Short Term Goals: To be achieved in: 3 weeks   - Independent in HEP and progression per patient tolerance, in order to prevent re-injury. []? Progressing: []? Met: []? Not Met: []? Adjusted   - Caregiver and patient to make necessary changes to home environment based on handout provided  []? Progressing: []? Met: []? Not Met: []? Adjusted     Long Term Goals: To be achieved in: 6 weeks   - Pt to improve strength to 4+/5 or better to allow for proper functional mobility as indicated by patients Functional Deficits. []? Progressing: []? Met: []? Not Met: []? Adjusted   - Pt to demonstrate improved gait pattern including improved step length and salbador without cues with use of no AD  []? Progressing: []? Met: []? Not Met: []? Adjusted   - Pt to negotiate up/down 1 curb step with No Device Independent  []? Progressing: []? Met: []? Not Met: []? Adjusted   - Improve Tinetti score to 23 or better to facilitate improvement in movement, function, and ADLs as indicated by Functional Deficits.   []? Progressing: []? Met: []? Not Met: []? Adjusted   -Caregiver to report no falls since initiating therapy  []? Progressing: []? Met: []? Not Met: []? Adjusted       Overall Progression Towards Functional goals/ Treatment Progress Update:  [] Patient is progressing as expected towards functional goals listed. [] Progression is slowed due to complexities/Impairments listed. [] Progression has been slowed due to co-morbidities. [x] Plan just implemented, too soon to assess goals progression <30days   [] Goals require adjustment due to lack of progress  [] Patient is not progressing as expected and requires additional follow up with physician  [] Other    Prognosis for POC: [x] Good [] Fair  [] Poor      Patient requires continued skilled intervention: [x] Yes  [] No    Treatment/Activity Tolerance:  [x] Patient able to complete treatment  [] Patient limited by fatigue  [] Patient limited by pain    [] Patient limited by other medical complications  [] Other:         PLAN:   [] Continue per plan of care [] Alter current plan (see comments above)  [x] Plan of care initiated [] Hold pending MD visit [] Discharge      Electronically signed by: Oralia Good, PT PT, DPT, OMT-C #[id number]   Note: If patient does not return for scheduled/ recommended follow up visits, this note will serve as a discharge from care along with most recent update on progress.

## 2022-05-10 ENCOUNTER — HOSPITAL ENCOUNTER (OUTPATIENT)
Dept: PHYSICAL THERAPY | Age: 62
Setting detail: THERAPIES SERIES
Discharge: HOME | End: 2022-05-10
Payer: MEDICARE

## 2022-05-10 PROCEDURE — 97110 THERAPEUTIC EXERCISES: CPT

## 2022-05-10 PROCEDURE — 97112 NEUROMUSCULAR REEDUCATION: CPT

## 2022-05-10 NOTE — FLOWSHEET NOTE
[facility]  [address]     [place], [facility] [place]  Phone: [phone]   Fax:   [phone]      Physical Therapy   [x] Daily Treatment Note   [] Progress Note   [] Discharge Note  Date:  5/10/2022    Patient Name:  Tho Daly    :  1960  MRN: [id number]    Medical/Treatment Diagnosis Information:  Diagnosis: falls, sequela (W19.xxxS)  Treatment Diagnosis: decreased dynamic balance with mild BLE weakness affecting gait pattern and functional mobility    Insurance/Certification information:  PT Insurance Information: medicare  + medicaid (BMN)    Physician Information:  Referring Practitioner: ROSA Burns    Plan of care signed: [x]Faxed  []Co-signature    (attempts: 1[x] 22 2 []3[])         Plan of care signed:      []Yes date:            [x]No      Date of Patient follow up with Physician: 2022    Is this a Progress Report:     []  Yes  [x]  No      If Yes:  Date Range for reporting period:  Beginnin22  Ending    Progress report will be due (10 Rx or 30 days whichever is less): May 18 0040  Recertification will be due (POC Duration  / 90 days whichever is less):  12 visits or 2022  Next FOTO assessment due:        May 4 2022      Visit # Insurance Allowable Auth Required   2- BMN []  Yes [x]  No        INITIAL EVALUATION:  Functional Scale: FOTO: 58/100     Date assessed:  22  (Predicted outcome score of: 59/100 within 11 visits per FOTO data system based on risk analysis)      RESTRICTIONS/PRECAUTIONS:   Precautions/ Contra-indications:   Red Flag Symptoms:   none  Safety awareness:decreased: caregiver reports patient needs cueing to attend to environment or obstacles at times  Other: [place] (requires some lip reading with mask down throughout session); MR (decreased command following ability), previous rib fractures with falls to R     Adhesive allergy: no  Latex Allergy:  no    Preferred Language for Healthcare:   [x]English       []other:    History of Present Illness  Per patient report 4/20/22:   General Kinnier falling in apartment: reports chair in way. Caregiver reports moving furniture around, however fell again approx 1.5 months later - unknown cause. Caregiver reports repeated falls occurring to R side. Lex Reddy going into bathroom with possible trip over rug. All rugs have been removed. Has fallen approx. 3 times in last 6 months. Lives by self in own apartment at group home, staff available 8 hours a day. No witnessed falls, staff does not notice any imbalance, possibility of inattention to barriers in environment.       Per ED note dated  1/6/22 KATRIN Pennington:   Howie Batista is a 59 y. o. female presenting to the emergency department for evaluation after fall. Nellie Landau stating she came out of the bathroom was turning the light on states she tripped and fell landed on her right side pointing to her right flank.  States she also hit her head.  Has a headache.  Her neck is sore.  Patient has MR.  She lives on her own at the group home.  Spoke with  states fall was unwitnessed states they asked her multiple times what happened and kept getting different stories. she had a bad fall in November where she fractured multiple ribs and had a pneumothorax,  stated patient had told EMS that she hit her head when she fell. Carmen Vega has been ambulatory since the fall.  She denies any back pain. The history is provided by the patient.  The history is limited by a developmental delay. \"     Per imaging completed in ED 1/6/22:  \"  Impression   Chest:       Multiple posterolateral right rib fractures in varying stages of healing,   suggesting multiple injuries at different times.  Displaced fractures of the   8th and 9th ribs appear acute, with adjacent pleural thickening.       Otherwise, no acute CT abnormality of the chest.       Heterogeneous thyroid gland with multiple hypoattenuating nodules, some of   which are calcified.  Further evaluation with nonemergent thyroid ultrasound   is recommended.       Abdomen/pelvis:       No acute CT abnormality in the abdomen or pelvis.       Hypoattenuating lesion in the pancreatic body measuring up to 10 mm, likely   representing a pancreatic cystic lesion.  Re-evaluation with CT of the   abdomen and pelvis in 1 year is recommended.       Spine:       No acute fracture or traumatic malalignment of the thoracic or lumbar spine. \"     Per chart review  Pt seeing Dr. Malaika Osborne for f/u for thyroid nodules: biopsies competed with benign R nodule however repeat of L needed and scheduled for 5/17/22      Date of provider/MD f/u appt: 4/21/22     Plan Moving Forward:    ·   Initiate static and dynamic balance activities       SUBJECTIVE:    No new issues    Pain level:     Currently: none  AT EVAL: none    OBJECTIVE:   Spo2 = 98% on scifit and HR = 77    Exercises/Interventions:     Exercises in bold performed in department today. Items not bolded are carried forward from prior visits for continuity of the record.   Exercise/Equipment Resistance/Repetitions HEP Other comments     scitfit Level 1.5 5 minutes [] BUE and BLE; break half way: required mask down      Standing heel raises 3x10-15 [x]      Standing hip abd 3x10-15 [x]      Standing marching 3x10-15 [x]      bridges 3x10 reps [x]      Squats at sink 3x10 reps []      gastroc stretch  NV []        []        []          []         []        []      Neuromuscular re-ed  []      Ball bounce (green ball) x10 reps forward normal JAHAIRA  X 10 reps forward narrow JAHAIRA []      Ambulating forward and backward w/ push/pull of large blue ball X 3 laps 12 ft []    Marching on foam surface 2x10 reps []      Static stance  Narrow JAHAIRA, eyes closed, foam 2x30 sec []      Ball dribble with orange SB X 100 ft  X 3 turns [] CGA     Therapeutic Exercise/Home Exercise Program:   10 minutes  HEP issued, reviewed and revised: see above Educated on fall risk reduction and POC for future visits. Watch for depth perception issues NV: difficulty with navigating up/down small ramp in // bars noted - may need to discuss visual assessment  Enjoys working with balls    Flexibility: decreased gastroc and TFL B    Reflexes:  Patellar: NV  Achilles: NV  Therapeutic Activity:  0 minutes     Gait: 0 minutes    Neuromuscular Re-Education:  30 minutes  See above    Manual Therapy:  0 minutes    Modalities: 0 minutes        Therapeutic Exercise and NMR EXR   [x] (84291) Provided verbal/tactile cueing for activities related to strengthening, flexibility, endurance, ROM  for improvements in proximal hip control, core control, knee control, ankle control, cervical alignment, thoracic alignment, lumbosacral alignment, shoulder control, elbow control and wrist control with self care, mobility, lifting and ambulation. [x] (54098) Provided verbal/tactile cueing for activities related to improving balance, coordination, kinesthetic sense, posture, motor skill, proprioception  to assist with core control in self care, mobility, lifting, and ambulation.      Therapeutic Activities:    [] (88721 or 84100) Provided verbal/tactile cueing for activities related to improving balance, coordination, kinesthetic sense, posture, motor skill, proprioception and motor activation to allow for proper function  with self care and ADLs  [] (43920) Provided training and instruction to the patient for proper core and proximal hip recruitment and positioning with ambulation re-education     Home Exercise Program:    [x] (28812) Reviewed/Progressed HEP activities related to strengthening, flexibility, endurance, ROM of core, proximal hip, LE, cervical spine, thoracic spine and UE for functional self-care, mobility, lifting and ambulation   [] (51699) Reviewed/Progressed HEP activities related to improving balance, coordination, kinesthetic sense, posture, motor skill, proprioception of core, proximal hip, LE, cervical spine, thoracic spine and UE for self care, mobility, lifting, and ambulation      Manual Treatments:  PROM / STM / Oscillations-Mobs:  G-I, II, III, IV (PA's, Inf., Post.)  [] (08533) Provided manual therapy to mobilize proximal hip, knee, ankle, shoulder, elbow, lumbosacral soft tissues/joints and cervicothoracic soft tissues/joints for the purpose of modulating pain, promoting relaxation,  increasing ROM, reducing/eliminating soft tissue swelling/inflammation/restriction, improving soft tissue extensibility and allowing for proper ROM for normal function with self care, mobility, lifting and ambulation. Modalities:     [] Ultrasound   [] Estim    [] Mechanical traction    [] Vaso-pneumatic device   [] Ionto     Charges:  Timed Code Treatment Minutes: 40   Total Treatment Minutes: 40     Medicare YTD amount: $250    [] EVAL  [x] GT(35071) x 1   [x] NMR (09247) x 2      [] Manual (29906) x       [] TA x     [] Gait Training x   [] WC Training x        [] ES(attended) (51484)        [] IONTO  [] VASO  [] Mech Traction (29070)  [] ES (un) (20212):   [] CD  [] Other:      ASSESSMENT:       GOALS: Goals established 4/20/22   Patient stated goal: \" Patient goals : less falling \"   []? Progressing: []? Met: []? Not Met: []? Adjusted     Therapist goals for Patient:   Short Term Goals: To be achieved in: 3 weeks   - Independent in HEP and progression per patient tolerance, in order to prevent re-injury. []? Progressing: []? Met: []? Not Met: []? Adjusted   - Caregiver and patient to make necessary changes to home environment based on handout provided  []? Progressing: []? Met: []? Not Met: []? Adjusted     Long Term Goals: To be achieved in: 6 weeks   - Pt to improve strength to 4+/5 or better to allow for proper functional mobility as indicated by patients Functional Deficits. []? Progressing: []? Met: []? Not Met: []?  Adjusted   - Pt to demonstrate improved gait pattern including improved step length and salbador without cues with use of no AD  []? Progressing: []? Met: []? Not Met: []? Adjusted   - Pt to negotiate up/down 1 curb step with No Device Independent  []? Progressing: []? Met: []? Not Met: []? Adjusted   - Improve Tinetti score to 23 or better to facilitate improvement in movement, function, and ADLs as indicated by Functional Deficits. []? Progressing: []? Met: []? Not Met: []? Adjusted   -Caregiver to report no falls since initiating therapy  []? Progressing: []? Met: []? Not Met: []? Adjusted       Overall Progression Towards Functional goals/ Treatment Progress Update:  [] Patient is progressing as expected towards functional goals listed. [] Progression is slowed due to complexities/Impairments listed. [] Progression has been slowed due to co-morbidities. [x] Plan just implemented, too soon to assess goals progression <30days   [] Goals require adjustment due to lack of progress  [] Patient is not progressing as expected and requires additional follow up with physician  [] Other    Prognosis for POC: [x] Good [] Fair  [] Poor      Patient requires continued skilled intervention: [x] Yes  [] No    Treatment/Activity Tolerance:  [x] Patient able to complete treatment  [] Patient limited by fatigue  [] Patient limited by pain    [] Patient limited by other medical complications  [] Other:         PLAN:   [x] Continue per plan of care [] Alter current plan (see comments above)  [] Plan of care initiated [] Hold pending MD visit [] Discharge      Electronically signed by: Eric Dorantes, PT PT, DPT, OMT-C #[id number]   Note: If patient does not return for scheduled/ recommended follow up visits, this note will serve as a discharge from care along with most recent update on progress.

## 2022-05-17 ENCOUNTER — PROCEDURE VISIT (OUTPATIENT)
Dept: ENT CLINIC | Age: 62
End: 2022-05-17

## 2022-05-17 VITALS
DIASTOLIC BLOOD PRESSURE: 59 MMHG | BODY MASS INDEX: 31.89 KG/M2 | SYSTOLIC BLOOD PRESSURE: 102 MMHG | TEMPERATURE: 98.2 F | HEIGHT: 63 IN | HEART RATE: 77 BPM | WEIGHT: 180 LBS

## 2022-05-17 DIAGNOSIS — E04.2 MULTINODULAR THYROID: Primary | ICD-10-CM

## 2022-05-17 NOTE — PROGRESS NOTES
Neck Lesion Fine Needle Aspiration      Pre op: Bilateral thyroid nodules, left previously biopsied and nondiagnostic  Post op: Same  Procedure: Fine needle aspiration of left thyroid nodule  Surgeon: ANGY Smith  Anesthesia: 1% lidocaine with epinephrine 1:100,000; 2cc used  Estimated Blood Loss: Minimal     The patient was placed in the examination chair and laid in supine position. Risks and benefits of the procedure including pain, infection, inflammation, nondiagnostic results, hematoma were outlined. Local anesthesia was infiltrated over the site with blanching appreciated  A  #25g needle was used to make multiple passes into the lesion under ultrasound guidance with aspiration to obtain cellular content. This was placed on slides for histopathology and into cytolyte for cytology. A final pass was made for [employer] testing and placed into the appropriate collection tube.      * Patient tolerated the procedure well with no complications  * Patient was instructed that they may notice minor bleeding or further inflammation of the site

## 2022-05-18 ENCOUNTER — HOSPITAL ENCOUNTER (OUTPATIENT)
Dept: PHYSICAL THERAPY | Age: 62
Setting detail: THERAPIES SERIES
Discharge: HOME | End: 2022-05-18
Payer: MEDICARE

## 2022-05-18 PROCEDURE — 97110 THERAPEUTIC EXERCISES: CPT

## 2022-05-18 PROCEDURE — 97112 NEUROMUSCULAR REEDUCATION: CPT

## 2022-05-18 NOTE — FLOWSHEET NOTE
[facility]  [address] [facility]  Phone: [phone]   Fax:   [phone]      Physical Therapy   [x] Daily Treatment Note   [] Progress Note   [] Discharge Note  Date:  2022    Patient Name:  Jovany Walton    :  1960  MRN: [id number]    Medical/Treatment Diagnosis Information:  Diagnosis: falls, sequela (W19.xxxS)  Treatment Diagnosis: decreased dynamic balance with mild BLE weakness affecting gait pattern and functional mobility    Insurance/Certification information:  PT Insurance Information: medicare  + medicaid (BMN)    Physician Information:  Referring Practitioner: ROSA Allen    Plan of care signed: [x]Faxed  []Co-signature    (attempts: 1[x] 22 2 []3[])         Plan of care signed:      []Yes date:            [x]No      Date of Patient follow up with Physician: 2022    Is this a Progress Report:     []  Yes  [x]  No      If Yes:  Date Range for reporting period:  Beginnin22  Ending    Progress report will be due (10 Rx or 30 days whichever is less): May 18 5938  Recertification will be due (POC Duration  / 90 days whichever is less):  12 visits or 2022  Next FOTO assessment due:        May 4 2022      Visit # Insurance Allowable Auth Required   3/-12 BMN []  Yes [x]  No        INITIAL EVALUATION:  Functional Scale: FOTO: 58/100     Date assessed:  22  (Predicted outcome score of: 59/100 within 11 visits per FOTO data system based on risk analysis)      RESTRICTIONS/PRECAUTIONS:   Precautions/ Contra-indications:   Red Flag Symptoms:   none  Safety awareness:decreased: caregiver reports patient needs cueing to attend to environment or obstacles at times  Other: [place] (requires some lip reading with mask down throughout session); MR (decreased command following ability), previous rib fractures with falls to R     Adhesive allergy: no  Latex Allergy:  no    Preferred Language for Healthcare:   [x]English       []other:    History of Present Illness  Per patient report 4/20/22:   Parmjit Roy falling in apartment: reports chair in way. Caregiver reports moving furniture around, however fell again approx 1.5 months later - unknown cause. Caregiver reports repeated falls occurring to R side. Idelle Sails going into bathroom with possible trip over rug. All rugs have been removed. Has fallen approx. 3 times in last 6 months. Lives by self in own apartment at group home, staff available 8 hours a day. No witnessed falls, staff does not notice any imbalance, possibility of inattention to barriers in environment.       Per ED note dated  1/6/22 KATRIN Lamas:   Ruth Batista is a 59 y. o. female presenting to the emergency department for evaluation after fall. Olamide Rosy stating she came out of the bathroom was turning the light on states she tripped and fell landed on her right side pointing to her right flank.  States she also hit her head.  Has a headache.  Her neck is sore.  Patient has MR.  She lives on her own at the group home.  Spoke with  states fall was unwitnessed states they asked her multiple times what happened and kept getting different stories. she had a bad fall in November where she fractured multiple ribs and had a pneumothorax,  stated patient had told EMS that she hit her head when she fell. Bo Wilkinson has been ambulatory since the fall.  She denies any back pain. The history is provided by the patient.  The history is limited by a developmental delay. \"     Per imaging completed in ED 1/6/22:  \"  Impression   Chest:       Multiple posterolateral right rib fractures in varying stages of healing,   suggesting multiple injuries at different times.  Displaced fractures of the   8th and 9th ribs appear acute, with adjacent pleural thickening.       Otherwise, no acute CT abnormality of the chest.       Heterogeneous thyroid gland with multiple hypoattenuating nodules, some of   which are calcified.  Further evaluation with nonemergent thyroid ultrasound   is recommended.       Abdomen/pelvis:       No acute CT abnormality in the abdomen or pelvis.       Hypoattenuating lesion in the pancreatic body measuring up to 10 mm, likely   representing a pancreatic cystic lesion.  Re-evaluation with CT of the   abdomen and pelvis in 1 year is recommended.       Spine:       No acute fracture or traumatic malalignment of the thoracic or lumbar spine. \"     Per chart review  Pt seeing Dr. Jesus Strange for f/u for thyroid nodules: biopsies competed with benign R nodule however repeat of L needed and scheduled for 5/17/22      Date of provider/MD f/u appt: 4/21/22     Plan Moving Forward:    ·   Initiate static and dynamic balance activities       SUBJECTIVE:    No new issues  Did just have vision checked according to caregiver but is wearing old glasses today     Pain level:     Currently: none  AT EVAL: none    OBJECTIVE:   Spo2 = 98% on scifit and HR = 77    Exercises/Interventions:     Exercises in bold performed in department today. Items not bolded are carried forward from prior visits for continuity of the record.   Exercise/Equipment Resistance/Repetitions HEP Other comments     scitfit Level 1 x 4 minutes [] BUE and BLE; rest between each minute     Standing heel raises 3x10-15 [x]      Standing hip abd 3x10-15 [x]      Standing marching 3x10-15 [x]      bridges 3x10 reps [x]      Squats at sink 3x10 reps []      gastroc stretch  30 sec [x]      Butterfly hip ER 2x10 reps green band [x]        []          []         []        []      Neuromuscular re-ed  []      Ball bounce (green ball)    Tennis/baloon toss X 10 reps forward narrow JAHAIRA  x10 reps forward narrow JAHAIRA on foam  2x15 reps narrow JAHAIRA on foam []      Ambulating forward and backward w/ push/pull of large blue ball X 3 laps 12 ft []    Marching on foam surface 2x10 reps []      Static stance  Narrow JAHAIRA, eyes closed, foam 2x30 sec []      Ball dribble with orange SB X 100 ft  X 3 obstacles around gym [] CGA     Therapeutic Exercise/Home Exercise Program:   15 minutes  HEP issued, reviewed and revised: see above   Educated on fall risk reduction and POC for future visits. Watch for depth perception issues NV: difficulty with navigating up/down small ramp in // bars noted - may need to discuss visual assessment  Enjoys working with balls      Reflexes:   Patellar: 1+B  Achilles: 1+ B  Negative clonus    Therapeutic Activity:  0 minutes     Gait: 0 minutes    Neuromuscular Re-Education:  25 minutes  See above    Manual Therapy:  0 minutes    Modalities: 0 minutes        Therapeutic Exercise and NMR EXR   [x] (33768) Provided verbal/tactile cueing for activities related to strengthening, flexibility, endurance, ROM  for improvements in proximal hip control, core control, knee control, ankle control, cervical alignment, thoracic alignment, lumbosacral alignment, shoulder control, elbow control and wrist control with self care, mobility, lifting and ambulation. [x] (48551) Provided verbal/tactile cueing for activities related to improving balance, coordination, kinesthetic sense, posture, motor skill, proprioception  to assist with core control in self care, mobility, lifting, and ambulation.      Therapeutic Activities:    [] (33867 or 60252) Provided verbal/tactile cueing for activities related to improving balance, coordination, kinesthetic sense, posture, motor skill, proprioception and motor activation to allow for proper function  with self care and ADLs  [] (52691) Provided training and instruction to the patient for proper core and proximal hip recruitment and positioning with ambulation re-education     Home Exercise Program:    [x] (81388) Reviewed/Progressed HEP activities related to strengthening, flexibility, endurance, ROM of core, proximal hip, LE, cervical spine, thoracic spine and UE for functional self-care, mobility, lifting and ambulation   [] (20666) Reviewed/Progressed HEP activities related to improving balance, coordination, kinesthetic sense, posture, motor skill, proprioception of core, proximal hip, LE, cervical spine, thoracic spine and UE for self care, mobility, lifting, and ambulation      Manual Treatments:  PROM / STM / Oscillations-Mobs:  G-I, II, III, IV (PA's, Inf., Post.)  [] (19279) Provided manual therapy to mobilize proximal hip, knee, ankle, shoulder, elbow, lumbosacral soft tissues/joints and cervicothoracic soft tissues/joints for the purpose of modulating pain, promoting relaxation,  increasing ROM, reducing/eliminating soft tissue swelling/inflammation/restriction, improving soft tissue extensibility and allowing for proper ROM for normal function with self care, mobility, lifting and ambulation. Modalities:     [] Ultrasound   [] Estim    [] Mechanical traction    [] Vaso-pneumatic device   [] Ionto     Charges:  Timed Code Treatment Minutes: 40   Total Treatment Minutes: 40     Medicare YTD amount: $350    [] EVAL  [x] WE(86729) x 1   [x] NMR (67852) x 2      [] Manual (49701) x       [] TA x     [] Gait Training x   [] WC Training x        [] ES(attended) (91403)        [] IONTO  [] VASO  [] Mech Traction (77941)  [] ES (un) (15990):   [] OH  [] Other:      ASSESSMENT:       GOALS: Goals established 4/20/22   Patient stated goal: \" Patient goals : less falling \"   []? Progressing: []? Met: []? Not Met: []? Adjusted     Therapist goals for Patient:   Short Term Goals: To be achieved in: 3 weeks   - Independent in HEP and progression per patient tolerance, in order to prevent re-injury. []? Progressing: []? Met: []? Not Met: []? Adjusted   - Caregiver and patient to make necessary changes to home environment based on handout provided  []? Progressing: []? Met: []? Not Met: []? Adjusted     Long Term Goals:  To be achieved in: 6 weeks   - Pt to improve strength to 4+/5 or better to allow for proper functional mobility as indicated by patients Functional Deficits. []? Progressing: []? Met: []? Not Met: []? Adjusted   - Pt to demonstrate improved gait pattern including improved step length and salbador without cues with use of no AD  []? Progressing: []? Met: []? Not Met: []? Adjusted   - Pt to negotiate up/down 1 curb step with No Device Independent  []? Progressing: []? Met: []? Not Met: []? Adjusted   - Improve Tinetti score to 23 or better to facilitate improvement in movement, function, and ADLs as indicated by Functional Deficits. []? Progressing: []? Met: []? Not Met: []? Adjusted   -Caregiver to report no falls since initiating therapy  []? Progressing: []? Met: []? Not Met: []? Adjusted       Overall Progression Towards Functional goals/ Treatment Progress Update:  [] Patient is progressing as expected towards functional goals listed. [] Progression is slowed due to complexities/Impairments listed. [] Progression has been slowed due to co-morbidities. [x] Plan just implemented, too soon to assess goals progression <30days   [] Goals require adjustment due to lack of progress  [] Patient is not progressing as expected and requires additional follow up with physician  [] Other    Prognosis for POC: [x] Good [] Fair  [] Poor      Patient requires continued skilled intervention: [x] Yes  [] No    Treatment/Activity Tolerance:  [x] Patient able to complete treatment  [] Patient limited by fatigue  [] Patient limited by pain    [] Patient limited by other medical complications  [] Other:         PLAN:   [x] Continue per plan of care [] Alter current plan (see comments above)  [] Plan of care initiated [] Hold pending MD visit [] Discharge      Electronically signed by: Francesco Connell, PT PT, DPT, OMT-C #[id number]   Note: If patient does not return for scheduled/ recommended follow up visits, this note will serve as a discharge from care along with most recent update on progress.

## 2022-05-20 ENCOUNTER — TELEPHONE (OUTPATIENT)
Dept: OTOLARYNGOLOGY | Age: 62
End: 2022-05-20

## 2022-05-20 NOTE — TELEPHONE ENCOUNTER
Called patient to communicate results of thyroid nodule FNAB - benign.  Left message, please notify her if she calls back

## 2022-05-25 ENCOUNTER — HOSPITAL ENCOUNTER (OUTPATIENT)
Dept: PHYSICAL THERAPY | Age: 62
Setting detail: THERAPIES SERIES
Discharge: HOME | End: 2022-05-25
Payer: MEDICARE

## 2022-05-25 PROCEDURE — 97110 THERAPEUTIC EXERCISES: CPT

## 2022-05-25 PROCEDURE — 97112 NEUROMUSCULAR REEDUCATION: CPT

## 2022-05-25 NOTE — PROGRESS NOTES
[facility]  [address]     [place]  Phone: [phone]   Fax:   [phone]      Physical Therapy   [x] Daily Treatment Note   [x] Progress Note   [] Discharge Note  Date:  2022    Patient Name:  Yue Snyder    :  1960  MRN: [id number]    Medical/Treatment Diagnosis Information:  Diagnosis: falls, sequela (W19.xxxS)  Treatment Diagnosis: decreased dynamic balance with mild BLE weakness affecting gait pattern and functional mobility    Insurance/Certification information:  PT Insurance Information: medicare  + medicaid (BMN)    Physician Information:  Referring Practitioner: ROSA Shankar    Plan of care signed: [x]Faxed  []Co-signature    (attempts: 1[x] 22 2 [x]22 3[])         Plan of care signed:      []Yes date:            [x]No      Date of Patient follow up with Physician: 2022    Is this a Progress Report:     [x]  Yes  []  No      If Yes:  Date Range for reporting period:  Beginnin22  Ending 22    Progress report will be due (10 Rx or 30 days whichever is less):   1009  Recertification will be due (POC Duration  / 90 days whichever is less):  12 visits or 2022  Next FOTO assessment due:        May 4 2022      Visit # Insurance Allowable Auth Required   - BMN []  Yes [x]  No        INITIAL EVALUATION:  Functional Scale: FOTO: 58/100     Date assessed:  22  (Predicted outcome score of: 59/100 within 11 visits per FOTO data system based on risk analysis)     Functional  Scale: FOTO 55/100  (estimated with therapist due to cognitive deficits)   Date assessed: 22      RESTRICTIONS/PRECAUTIONS:   Precautions/ Contra-indications:   Red Flag Symptoms:   none  Safety awareness:decreased: caregiver reports patient needs cueing to attend to environment or obstacles at times  Other: [place] (requires some lip reading with mask down throughout session); [place] (decreased command following ability), previous rib fractures with falls to R     Adhesive allergy: no  Latex Allergy:  no    Preferred Language for Healthcare:   [x]English       []other:    History of Present Illness  Per patient report 4/20/22:   Kimberli Luis falling in apartment: reports chair in way. Caregiver reports moving furniture around, however fell again approx 1.5 months later - unknown cause. Caregiver reports repeated falls occurring to R side. Lorette Degree going into bathroom with possible trip over rug. All rugs have been removed. Has fallen approx. 3 times in last 6 months. Lives by self in own apartment at group home, staff available 8 hours a day. No witnessed falls, staff does not notice any imbalance, possibility of inattention to barriers in environment.       Per ED note dated  1/6/22 KATRIN Choudhary:   Wesley Batista is a 59 y. o. female presenting to the emergency department for evaluation after fall. Yenni Johnny stating she came out of the bathroom was turning the light on states she tripped and fell landed on her right side pointing to her right flank.  States she also hit her head.  Has a headache.  Her neck is sore.  Patient has MR.  She lives on her own at the group home.  Spoke with  states fall was unwitnessed states they asked her multiple times what happened and kept getting different stories. she had a bad fall in November where she fractured multiple ribs and had a pneumothorax,  stated patient had told EMS that she hit her head when she fell. Anabela Youngblood has been ambulatory since the fall.  She denies any back pain. The history is provided by the patient.  The history is limited by a developmental delay. \"     Per imaging completed in ED 1/6/22:  \"  Impression   Chest:       Multiple posterolateral right rib fractures in varying stages of healing,   suggesting multiple injuries at different times.  Displaced fractures of the   8th and 9th ribs appear acute, with adjacent pleural thickening.       Otherwise, no acute CT abnormality of the chest.       Heterogeneous thyroid gland with multiple hypoattenuating nodules, some of   which are calcified.  Further evaluation with nonemergent thyroid ultrasound   is recommended.       Abdomen/pelvis:       No acute CT abnormality in the abdomen or pelvis.       Hypoattenuating lesion in the pancreatic body measuring up to 10 mm, likely   representing a pancreatic cystic lesion.  Re-evaluation with CT of the   abdomen and pelvis in 1 year is recommended.       Spine:       No acute fracture or traumatic malalignment of the thoracic or lumbar spine. \"     Per chart review  Pt seeing Dr. Lebron Horne for f/u for thyroid nodules: biopsies competed with benign R nodule however repeat of L needed and scheduled for 5/17/22      Date of provider/MD f/u appt: 4/21/22     Plan Moving Forward:    ·   Initiate static and dynamic balance activities       SUBJECTIVE:    No new issues  Did just have vision checked according to caregiver but is wearing old glasses today     Pain level:     Currently: none  AT EVAL: none    OBJECTIVE:     Exercises/Interventions:     Exercises in bold performed in department today. Items not bolded are carried forward from prior visits for continuity of the record.   Exercise/Equipment Resistance/Repetitions HEP Other comments     scitfit  nustep Level 1 x 4 minutes  Level 4 x 6 minutes [] BUE and BLE;   rest between each minute     Standing heel raises 3x10-15 [x]      Standing hip abd 3x10-15 [x]      Standing marching 3x10-15 [x]      bridges 3x10 reps [x]      Squats at sink 3x10 reps []      gastroc stretch  30 sec [x]      Butterfly hip ER 2x10 reps green band [x]      Leg press x10 reps 22#  2x10 33# [] Cues for full range         []         []        []      Neuromuscular re-ed  []      Ball bounce (green ball)    Tennis/baloon toss X 10 reps forward narrow JAHAIRA  x10 reps forward narrow JAHAIRA on foam  2x15 reps narrow JAHAIRA on foam []      Ambulating forward and backward w/ push/pull of large blue ball X 3 laps 12 ft []    Marching on foam surface 2x10 reps []      Static stance  Narrow JAHAIRA, eyes closed, foam 2x30 sec []    rebounder orange SB 2x10 narrow JAHAIRA firm       Ball dribble with orange SB X 100 ft  X 3 obstacles around gym [] CGA     Therapeutic Exercise/Home Exercise Program:   15 minutes  HEP issued, reviewed and revised: see above   Educated on fall risk reduction and POC for future visits. Watch for depth perception issues:difficulty with navigating up/down small ramp in // bars noted - may need to discuss visual assessment  Enjoys working with balls    Strength/ROM    [x]? All ROM WNL except as marked below    [x]? All strength measured on 5 point scale   \"*\" Indicates pain with movement  Movement Left Right []? ?AROM  []? PROM   Hip Flexion 4  4+ 4  4+       Hip Extension 4  4+ 4  4+       Hip Abduction 3+  Deferred due to cramping 4-  Deferred due to cramping       Hip Adduction Not tested Not tested       Hip Internal Rotation Not tested Not tested       Hip External Rotation Not tested Not tested       Knee Flexion 4+  5 4+  5       Knee Extension 4-  5 4-  5       Ankle Dorsiflexion 4-  5 4-  5       Ankle Plantarflexion 4- 3+       Ankle Inversion Not tested Not tested       Ankle Eversion Not tested Not tested       Great Toe extension Not tested Not tested        Comment: difficulty following commands throughout MMT       Reflexes:   Patellar: 1+B  Achilles: 1+ B  Negative clonus    Therapeutic Activity:  0 minutes     Gait: 5 minutes  Ambulating around clinic with faster salbador and improved step length noted     Curb bout #: 1:  Level of assist: Independent-supervision  Device: No AD  Height: 6 inch  Pt able to negotiate up/down 2 curb steps: no handrail  Comment:increased time to assure footing      Neuromuscular Re-Education:  25 minutes  See above  Tinetti: 5/25/22  Balance Score: 15  Gait Score: 9  Tinetti Total Provided manual therapy to mobilize proximal hip, knee, ankle, shoulder, elbow, lumbosacral soft tissues/joints and cervicothoracic soft tissues/joints for the purpose of modulating pain, promoting relaxation,  increasing ROM, reducing/eliminating soft tissue swelling/inflammation/restriction, improving soft tissue extensibility and allowing for proper ROM for normal function with self care, mobility, lifting and ambulation. Modalities:     [] Ultrasound   [] Estim    [] Mechanical traction    [] Vaso-pneumatic device   [] Ionto     Charges:  Timed Code Treatment Minutes: 45   Total Treatment Minutes: 45     Medicare YTD amount: $450    [] EVAL  [x] NT(69548) x 1   [x] NMR (69723) x 2      [] Manual (57862) x       [] TA x     [] Gait Training x   [] WC Training x        [] ES(attended) (89972)        [] IONTO  [] VASO  [] Mech Traction (82073)  [] ES (un) (27131):   [] YB  [] Other:      ASSESSMENT:  Pt progressed well within 5 visits of PT meeting all goals stated below. Good understanding of HEP with continue with assistance of caregivers. No falls since initiating therapy. Demonstrated improved dynamic standing balance and gait pattern as well as improved BLE strength to [facility]. Plan to DC from caseload and return if any regression or concerns with new script as needed.      GOALS: Goals established 4/20/22   Patient stated goal: \" Patient goals : less falling \"   []? Progressing: [x]? Met: []? Not Met: []? Adjusted     Therapist goals for Patient:   Short Term Goals: To be achieved in: 3 weeks   - Independent in HEP and progression per patient tolerance, in order to prevent re-injury. []? Progressing: [x]? Met: []? Not Met: []? Adjusted   - Caregiver and patient to make necessary changes to home environment based on handout provided  []? Progressing: [x]? Met: []? Not Met: []? Adjusted     Long Term Goals:  To be achieved in: 6 weeks   - Pt to improve strength to 4+/5 or better to allow for proper functional mobility as indicated by patients Functional Deficits. []? Progressing: [x]? Met: []? Not Met: []? Adjusted   - Pt to demonstrate improved gait pattern including improved step length and salbador without cues with use of no AD  []? Progressing: [x]? Met: []? Not Met: []? Adjusted   - Pt to negotiate up/down 1 curb step with No Device Independent  []? Progressing: [x]? Met: []? Not Met: []? Adjusted   - Improve Tinetti score to 23 or better to facilitate improvement in movement, function, and ADLs as indicated by Functional Deficits. []? Progressing: [x]? Met: []? Not Met: []? Adjusted   -Caregiver to report no falls since initiating therapy  []? Progressing: [x]? Met: []? Not Met: []? Adjusted       Overall Progression Towards Functional goals/ Treatment Progress Update:  [x] Patient is progressing as expected towards functional goals listed. [] Progression is slowed due to complexities/Impairments listed. [] Progression has been slowed due to co-morbidities. [] Plan just implemented, too soon to assess goals progression <30days   [] Goals require adjustment due to lack of progress  [] Patient is not progressing as expected and requires additional follow up with physician  [] Other    Prognosis for POC: [x] Good [] Fair  [] Poor      Patient requires continued skilled intervention: [x] Yes  [] No    Treatment/Activity Tolerance:  [x] Patient able to complete treatment  [] Patient limited by fatigue  [] Patient limited by pain    [] Patient limited by other medical complications  [] Other:         PLAN:   [] Continue per plan of care [] Alter current plan (see comments above)  [] Plan of care initiated [] Hold pending MD visit [x] Discharge      Electronically signed by: Theodora Hearn, PT PT, DPT, OMT-C #[id number]   Note: If patient does not return for scheduled/ recommended follow up visits, this note will serve as a discharge from care along with most recent update on progress.

## 2022-08-04 ENCOUNTER — HOSPITAL ENCOUNTER (EMERGENCY)
Age: 62
Discharge: HOME | End: 2022-08-04
Attending: STUDENT IN AN ORGANIZED HEALTH CARE EDUCATION/TRAINING PROGRAM
Payer: MEDICARE

## 2022-08-04 ENCOUNTER — APPOINTMENT (OUTPATIENT)
Dept: CT IMAGING | Age: 62
End: 2022-08-04
Payer: MEDICARE

## 2022-08-04 ENCOUNTER — APPOINTMENT (OUTPATIENT)
Dept: GENERAL RADIOLOGY | Age: 62
End: 2022-08-04
Payer: MEDICARE

## 2022-08-04 VITALS
TEMPERATURE: 99.2 F | RESPIRATION RATE: 21 BRPM | HEART RATE: 62 BPM | DIASTOLIC BLOOD PRESSURE: 75 MMHG | OXYGEN SATURATION: 96 % | SYSTOLIC BLOOD PRESSURE: 138 MMHG

## 2022-08-04 DIAGNOSIS — N39.0 URINARY TRACT INFECTION WITHOUT HEMATURIA, SITE UNSPECIFIED: ICD-10-CM

## 2022-08-04 DIAGNOSIS — S09.90XA INJURY OF HEAD, INITIAL ENCOUNTER: Primary | ICD-10-CM
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
## 2022-08-04 LAB
A/G RATIO: 1.4 (ref 1.1–2.2)
ALBUMIN SERPL-MCNC: 4.4 G/DL (ref 3.4–5)
ALP BLD-CCNC: 75 U/L (ref 40–129)
ALT SERPL-CCNC: 14 U/L (ref 10–40)
ANION GAP SERPL CALCULATED.3IONS-SCNC: 10 MMOL/L (ref 3–16)
AST SERPL-CCNC: 15 U/L (ref 15–37)
BACTERIA: ABNORMAL /HPF
BASOPHILS ABSOLUTE: 0 K/UL (ref 0–0.2)
BASOPHILS RELATIVE PERCENT: 0.6 %
BILIRUB SERPL-MCNC: <0.2 MG/DL (ref 0–1)
BILIRUBIN URINE: NEGATIVE
BLOOD, URINE: NEGATIVE
BUN BLDV-MCNC: 15 MG/DL (ref 7–20)
CALCIUM SERPL-MCNC: 8.9 MG/DL (ref 8.3–10.6)
CHLORIDE BLD-SCNC: 104 MMOL/L (ref 99–110)
CLARITY: CLEAR
CO2: 27 MMOL/L (ref 21–32)
COLOR: ABNORMAL
CREAT SERPL-MCNC: <0.5 MG/DL (ref 0.6–1.2)
D DIMER: 0.48 UG/ML FEU (ref 0–0.6)
EKG ATRIAL RATE: 75 BPM
EKG DIAGNOSIS: NORMAL
EKG P AXIS: 23 DEGREES
EKG P-R INTERVAL: 156 MS
EKG Q-T INTERVAL: 384 MS
EKG QRS DURATION: 86 MS
EKG QTC CALCULATION (BAZETT): 428 MS
EKG R AXIS: 7 DEGREES
EKG T AXIS: 14 DEGREES
EKG VENTRICULAR RATE: 75 BPM
EOSINOPHILS ABSOLUTE: 0.1 K/UL (ref 0–0.6)
EOSINOPHILS RELATIVE PERCENT: 1.9 %
GFR AFRICAN AMERICAN: >60
GFR NON-AFRICAN AMERICAN: >60
GLUCOSE BLD-MCNC: 120 MG/DL (ref 70–99)
GLUCOSE URINE: NEGATIVE MG/DL
HCT VFR BLD CALC: 36.7 % (ref 36–48)
HEMOGLOBIN: 12.2 G/DL (ref 12–16)
INFLUENZA A: NOT DETECTED
INFLUENZA B: NOT DETECTED
KETONES, URINE: NEGATIVE MG/DL
LEUKOCYTE ESTERASE, URINE: NEGATIVE
LYMPHOCYTES ABSOLUTE: 1.5 K/UL (ref 1–5.1)
LYMPHOCYTES RELATIVE PERCENT: 38.1 %
MCH RBC QN AUTO: 29.7 PG (ref 26–34)
MCHC RBC AUTO-ENTMCNC: 33.4 G/DL (ref 31–36)
MCV RBC AUTO: 88.9 FL (ref 80–100)
MICROSCOPIC EXAMINATION: YES
MONOCYTES ABSOLUTE: 0.3 K/UL (ref 0–1.3)
MONOCYTES RELATIVE PERCENT: 8 %
NEUTROPHILS ABSOLUTE: 2 K/UL (ref 1.7–7.7)
NEUTROPHILS RELATIVE PERCENT: 51.4 %
NITRITE, URINE: POSITIVE
PDW BLD-RTO: 14 % (ref 12.4–15.4)
PH UA: 6 (ref 5–8)
PLATELET # BLD: 190 K/UL (ref 135–450)
PMV BLD AUTO: 7.3 FL (ref 5–10.5)
POTASSIUM REFLEX MAGNESIUM: 4.1 MMOL/L (ref 3.5–5.1)
PROTEIN UA: NEGATIVE MG/DL
RBC # BLD: 4.12 M/UL (ref 4–5.2)
RBC UA: ABNORMAL /HPF (ref 0–4)
S PYO AG THROAT QL: NEGATIVE
SARS-COV-2 RNA, RT PCR: NOT DETECTED
SODIUM BLD-SCNC: 141 MMOL/L (ref 136–145)
SPECIFIC GRAVITY UA: 1.01 (ref 1–1.03)
TOTAL PROTEIN: 7.5 G/DL (ref 6.4–8.2)
URINE REFLEX TO CULTURE: ABNORMAL
URINE TYPE: ABNORMAL
UROBILINOGEN, URINE: 0.2 E.U./DL
WBC # BLD: 3.9 K/UL (ref 4–11)
WBC UA: ABNORMAL /HPF (ref 0–5)

## 2022-08-04 PROCEDURE — 93010 ELECTROCARDIOGRAM REPORT: CPT | Performed by: INTERNAL MEDICINE

## 2022-08-04 PROCEDURE — 85379 FIBRIN DEGRADATION QUANT: CPT

## 2022-08-04 PROCEDURE — 87081 CULTURE SCREEN ONLY: CPT

## 2022-08-04 PROCEDURE — 81001 URINALYSIS AUTO W/SCOPE: CPT

## 2022-08-04 PROCEDURE — 80053 COMPREHEN METABOLIC PANEL: CPT

## 2022-08-04 PROCEDURE — 87636 SARSCOV2 & INF A&B AMP PRB: CPT

## 2022-08-04 PROCEDURE — 85025 COMPLETE CBC W/AUTO DIFF WBC: CPT

## 2022-08-04 PROCEDURE — 99285 EMERGENCY DEPT VISIT HI MDM: CPT

## 2022-08-04 PROCEDURE — 70450 CT HEAD/BRAIN W/O DYE: CPT

## 2022-08-04 PROCEDURE — 87880 STREP A ASSAY W/OPTIC: CPT

## 2022-08-04 PROCEDURE — 72125 CT NECK SPINE W/O DYE: CPT

## 2022-08-04 PROCEDURE — 71046 X-RAY EXAM CHEST 2 VIEWS: CPT

## 2022-08-04 PROCEDURE — 93005 ELECTROCARDIOGRAM TRACING: CPT | Performed by: NURSE PRACTITIONER

## 2022-08-04 PROCEDURE — 36415 COLL VENOUS BLD VENIPUNCTURE: CPT

## 2022-08-04 RX ORDER — CEFUROXIME AXETIL 250 MG/1
250 TABLET ORAL 2 TIMES DAILY
Qty: 14 TABLET | Refills: 0 | Status: SHIPPED | OUTPATIENT
Start: 2022-08-04 | End: 2022-08-11

## 2022-08-04 RX ORDER — CEFUROXIME AXETIL 250 MG/1
250 TABLET ORAL ONCE
Status: DISCONTINUED | OUTPATIENT
Start: 2022-08-04 | End: 2022-08-04 | Stop reason: HOSPADM

## 2022-08-04 ASSESSMENT — ENCOUNTER SYMPTOMS
FACIAL SWELLING: 0
SHORTNESS OF BREATH: 0
RHINORRHEA: 0
COLOR CHANGE: 0
ABDOMINAL PAIN: 0
SORE THROAT: 0

## 2022-08-04 NOTE — ED PROVIDER NOTES
I independently performed a history and physical on [facility]. All diagnostic, treatment, and disposition decisions were made by myself in conjunction with the advanced practice provider/resident. Labs Reviewed   CBC WITH AUTO DIFFERENTIAL - Abnormal; Notable for the following components:       Result Value    WBC 3.9 (*)     All other components within normal limits   COMPREHENSIVE METABOLIC PANEL W/ REFLEX TO MG FOR LOW K - Abnormal; Notable for the following components:    Glucose 120 (*)     Creatinine <0.5 (*)     All other components within normal limits   URINALYSIS WITH REFLEX TO CULTURE - Abnormal; Notable for the following components:    Nitrite, Urine POSITIVE (*)     All other components within normal limits   MICROSCOPIC URINALYSIS - Abnormal; Notable for the following components:    WBC, UA 6-9 (*)     Bacteria, UA 4+ (*)     All other components within normal limits   COVID-19 & INFLUENZA COMBO   STREP SCREEN GROUP A THROAT   CULTURE, BETA STREP CONFIRM PLATES   D-DIMER, QUANTITATIVE     CT CERVICAL SPINE WO CONTRAST   Final Result   No acute abnormality of the cervical spine. Heterogeneous appearing thyroid with a right thyroid nodule that is been   evaluated with ultrasound in February 2022 and an FNA suggested, correlation   with pathology results suggested         CT HEAD WO CONTRAST   Final Result   No acute intracranial abnormality. XR CHEST (2 VW)   Final Result   No acute process. For further details of [address] [place] [address] emergency department encounter, please see the RA/resident's documentation. I personally saw the patient and performed a substantive portion of the visit including all aspects of the medical decision making. Briefly, this is a patient with history of developmental delay presenting with concerns for fall that occurred yesterday, reportedly fell off of her toilet seat and hit the back of her head and her right side on a grab bar.   Denies any loss of consciousness. Is not anticoagulated. Hard to obtain a complete history. CT of the head and C-spine negative for any acute concerning findings, did show incidental finding of heterogenous appearing thyroid with a right thyroid nodule that has been visualized previously but recommend correlation with pathology results. She has some tenderness to palpation over the right lower ribs on the lateral aspect as well, no crepitus or obvious deformity and chest x-ray is negative. Labs are performed and are reassuring. D-dimer is negative. COVID and flu are negative as well. Does have evidence of urinary tract infection will be started on antibiotics. Patient will be discharged home, advised follow-up with PCP if symptoms or not improving. I personally saw the patient and independently provided 0 minutes of non-concurrent critical care out of the total shared critical care time provided. 1. Injury of head, initial encounter    2. Fall, initial encounter    3. Urinary tract infection without hematuria, site unspecified      Comment: Please note this report has been produced using speech recognition software and may contain errors related to that system including errors in grammar, punctuation, and spelling, as well as words and phrases that may be inappropriate. If there are any questions or concerns please feel free to contact the dictating provider for clarification. The Ekg interpreted by me shows  normal sinus rhythm with a rate of 75  Axis is   Normal  QTc is  normal  Intervals and Durations are unremarkable.       ST Segments: nonspecific changes, nonspecific T wave flattening in multiple leads  No significant change from prior EKG dated 1/6/2022         Saskia Ellis MD  08/04/22 [address] Angella Yoon MD  08/04/22 1924

## 2022-08-04 NOTE — ED PROVIDER NOTES
[address] ED  EMERGENCY DEPARTMENT ENCOUNTER        Pt Name: Selena Navas  MRN: [id number]  [address]f[address]ngel 1960  Dateof evaluation: 8/4/2022  Provider: LISSETT Yates - MARGARITA  PCP: Jenny Velez MD  ED Attending: No att. providers found    [facility]       Chief Complaint   Patient presents with    [facility] giver states that it was reported that patient fell in apartment sometime yesterday (fell off of toilet seat hitting back and hit head on grab bar) C/O pain to right lower side pain. Patient states she did hit head, no LOC. HISTORY OF PRESENTILLNESS   (Location/Symptom, Timing/Onset, Context/Setting, Quality, Duration, Modifying Factors, Severity)  Note limiting factors. Selena Navas is a 57 y.o. female for fall. Onset was yesterday. Context includes patient reports that she was sitting on the toilet yesterday and became dizzy. Patient states that she fell forward. Patient also reports that she has had a cough and is having right-sided rib pain. Patient denies any loss of consciousness. Patient does have developmental delay and lives in a facility. Alleviating factors include nothing. Aggravating factors include nothing. Nothing has been used for pain today. Nursing Notes were all reviewed and agreed with or any disagreements were addressed  in the HPI. REVIEW OF SYSTEMS    (2-9 systems for level 4, 10 or more for level 5)     Review of Systems   Constitutional:  Negative for activity change, appetite change and fever. Fall closed head injury   HENT:  Negative for congestion, facial swelling, rhinorrhea and sore throat. Eyes:  Negative for visual disturbance. Respiratory:  Negative for shortness of breath. Right rib pain   Cardiovascular:  Negative for chest pain. Gastrointestinal:  Negative for abdominal pain. Genitourinary:  Negative for difficulty urinating. Musculoskeletal:  Negative for arthralgias and myalgias. Skin:  Negative for color change and rash. Neurological:  Positive for dizziness. Negative for light-headedness. Psychiatric/Behavioral:  Negative for agitation. All other systems reviewed and are negative. Positives and Pertinent negatives as per HPI. Except as noted above in the ROS, all other systems were reviewed and negative. PAST MEDICAL HISTORY     Past Medical History:   Diagnosis Date    Allergic rhinitis     Diabetes mellitus ([address]     Hypercholesteremia     Mental retardation     No history of procedure 2/29/16    no past colonoscopy         SURGICAL HISTORY       Past Surgical History:   Procedure Laterality Date    ANKLE SURGERY      COLONOSCOPY  01/10/2012    TUBAL LIGATION      UPPER GASTROINTESTINAL ENDOSCOPY  2/29/16         CURRENT MEDICATIONS       Previous Medications    AMMONIUM LACTATE (AMLACTIN) 12 % CREAM    Apply topically 2 times daily Apply twice daily to feet. ARIPIPRAZOLE (ABILIFY) 5 MG TABLET        ASPIRIN 81 MG EC TABLET    Take 81 mg by mouth daily    ATORVASTATIN (LIPITOR) 20 MG TABLET    Take 20 mg by mouth daily. BENZTROPINE (COGENTIN) 1 MG TABLET        CICLOPIROX (LOPROX) 0.77 % CREAM    Apply topically 2 times daily Apply topically 2 times daily. ESOMEPRAZOLE (NEXIUM) 40 MG DELAYED RELEASE CAPSULE    Take 40 mg by mouth every morning (before breakfast)    FEXOFENADINE (ALLEGRA) 180 MG TABLET    Take 180 mg by mouth daily. FLUOXETINE (PROZAC) 20 MG CAPSULE    Take 40 mg by mouth daily. FLUTICASONE (FLONASE) 50 MCG/ACT NASAL SPRAY    1 spray by Each Nostril route daily    LIDOCAINE (LIDODERM) 5 %    Place 1 patch onto the skin daily 12 hours on, 12 hours off.     METFORMIN (GLUCOPHAGE) 500 MG TABLET    Take 500 mg by mouth daily 2 tablets daily    MONTELUKAST (SINGULAIR) 10 MG TABLET    Take 10 mg by mouth nightly    NABUMETONE (RELAFEN) 500 MG TABLET    Take 750 mg by mouth daily     NYSTATIN (MYCOSTATIN) 162402 UNIT/GM CREAM    Apply topically Three times per week    PROPYLTHIOURACIL (PTU) 50 MG TABLET    Take 50 mg by mouth 3 times daily     SUCRALFATE (CARAFATE) 1 GM/10ML SUSPENSION    Take 10 mLs by mouth 4 times daily for 7 days. THIORIDAZINE (MELLARIL) 25 MG TABLET    Take 25 mg by mouth Daily. Indications: HS         ALLERGIES     Patient has no known allergies. FAMILY HISTORY     History reviewed. No pertinent family history. SOCIAL HISTORY       Social History     Socioeconomic History    Marital status: Single     Spouse name: None    Number of children: None    Years of education: None    Highest education level: None   Tobacco Use    Smoking status: Never    Smokeless tobacco: Never   Vaping Use    Vaping Use: Never used   Substance and Sexual Activity    Alcohol use: No    Drug use: No    Sexual activity: Never       SCREENINGS   NIH Stroke Scale  Interval: Baseline  Level of Consciousness (1a): Alert  LOC Questions (1b): Answers one correctly  LOC Commands (1c): Performs one task correctly  Best Gaze (2): Normal  Visual (3): No visual loss  Facial Palsy (4): Normal symmetrical movement  Motor Arm, Left (5a): No drift  Motor Arm, Right (5b): No drift  Motor Leg, Left (6a): No drift  Motor Leg, Right (6b): No drift  Limb Ataxia (7): Absent  Dysarthria (10): Normal  Extinction and Inattention (11): No abnormalityGlasgow Coma Scale  Eye Opening: Spontaneous  Best Verbal Response: Oriented  Best Motor Response: Obeys commands  [place] Coma Scale Score: 15        PHYSICAL EXAM  (up to 7 for level 4, 8 or more for level 5)     ED Triage Vitals [08/04/22 1424]   BP Temp Temp Source Heart Rate Resp SpO2 Height Weight   136/74 99.2 °F (37.3 °C) Oral 85 16 97 % -- --       Physical Exam  Constitutional:       Appearance: Normal appearance. She is well-developed. HENT:      Head: Normocephalic and atraumatic. Eyes:      Pupils: Pupils are equal, round, and reactive to light.       Comments: No obvious nystagmus however the patient struggled following commands for extraocular movements   Cardiovascular:      Rate and Rhythm: Normal rate. Pulmonary:      Effort: Pulmonary effort is normal. No respiratory distress. Breath sounds: Decreased air movement present. Chest:      Chest wall: Tenderness present. Abdominal:      General: There is no distension. Palpations: Abdomen is soft. Tenderness: There is no abdominal tenderness. Musculoskeletal:         General: Normal range of motion. Cervical back: Normal range of motion. No spinous process tenderness or muscular tenderness. Skin:     General: Skin is warm and dry. Neurological:      Mental Status: She is alert. Mental status is at baseline. Comments: Patient alert and oriented to person and place       DIAGNOSTIC RESULTS   LABS:    Labs Reviewed   CBC WITH AUTO DIFFERENTIAL - Abnormal; Notable for the following components:       Result Value    WBC 3.9 (*)     All other components within normal limits   COMPREHENSIVE METABOLIC PANEL W/ REFLEX TO MG FOR LOW K - Abnormal; Notable for the following components:    Glucose 120 (*)     Creatinine <0.5 (*)     All other components within normal limits   URINALYSIS WITH REFLEX TO CULTURE - Abnormal; Notable for the following components:    Nitrite, Urine POSITIVE (*)     All other components within normal limits   MICROSCOPIC URINALYSIS - Abnormal; Notable for the following components:    WBC, UA 6-9 (*)     Bacteria, UA 4+ (*)     All other components within normal limits   COVID-19 & INFLUENZA COMBO   STREP SCREEN GROUP A THROAT   CULTURE, BETA STREP CONFIRM PLATES   D-DIMER, QUANTITATIVE       All other labs werewithin normal range or not returned as of this dictation. EKG: All EKG's are interpreted by the Emergency Department Physician who either signs or Co-signs this chart in the absence of a cardiologist.  Please see their note for interpretation of EKG.       RADIOLOGY:   CT of the head interpreted by radiologist for  Impression:    No acute intracranial abnormality. CT of the cervical spine interpreted by radiologist for  Impression:    No acute abnormality of the cervical spine. Heterogeneous appearing thyroid with a right thyroid nodule that is been   evaluated with ultrasound in February 2022 and an FNA suggested, correlation   with pathology results suggested          Chest x-ray interpreted by radiologist for  Impression:    No acute process. Interpretation per the Radiologist below, if available at the time of this note:    CT CERVICAL SPINE WO CONTRAST   Final Result   No acute abnormality of the cervical spine. Heterogeneous appearing thyroid with a right thyroid nodule that is been   evaluated with ultrasound in February 2022 and an FNA suggested, correlation   with pathology results suggested         CT HEAD WO CONTRAST   Final Result   No acute intracranial abnormality. XR CHEST (2 VW)   Final Result   No acute process. No results found. PROCEDURES   Unless otherwise noted below, none     Procedures     CRITICAL CARE TIME   N/A    CONSULTS:  None      EMERGENCYDEPARTMENT COURSE and DIFFERENTIAL DIAGNOSIS/MDM:   Vitals:    Vitals:    08/04/22 1424 08/04/22 1645 08/04/22 1715 08/04/22 1745   BP: 136/74 128/72 127/65 136/84   Pulse: 85 71 67 87   Resp: 16 22 21 19   Temp: 99.2 °F (37.3 °C)      TempSrc: Oral      SpO2: 97% 98% 99% 97%       Patient was given the following medications:  Medications   cefUROXime (CEFTIN) tablet 250 mg (has no administration in time range)       Patient was seen evaluated by myself. Patient here for concerns for a fall. Patient reportedly has MRDD and lives in a assisted living facility. Patient states that she did fall yesterday while sitting on the commode. She does claim to be have been dizzy prior to the fall. She states that she fell forward hitting her head on the handrail.   She also struck the the right lateral and posterior rib cage. Patient is also reporting that she now has a cough. On exam she is awake and alert. She is at baseline per her caregiver. She is oriented to person and place. Patient does have reproducible right lateral rib pain. Lab values have been reviewed and interpreted. CT of her head neck of been obtained. Chest x-ray has been reviewed as well. NIH was 2 per the nurse however these were in reference to being able to follow commands and answering questions which patient does have a history of MRDD. I do not suspect neurologic deficits at this time. Imaging and lab work have all been obtained and are reassuring. Patient will be discharged with instructions on contusions. Her urine was positive for nitrates and had a l a few WBCs will be treated for UTI. She was encouraged to follow-up with her primary care doctor in the next few days and return to the ED for worsening symptoms. Patient was ultimately discharged with all questions answered. CT did reveal a thyroid nodule however it appears the patient had a fine-needle aspirate done on 5/17/2022 and is being followed by ENT. Biopsy results were benign according to the reports that I found in the chart. Is this patient to be included in the SEP-1 Core Measure due to severe sepsis or septic shock? No   Exclusion criteria - the patient is NOT to be included for SEP-1 Core Measure due to: Infection is not suspected       The patient tolerated their visit well. I have evaluated this patient. My supervising physician was available for consultation. The patient and / or the family were informed of the results of any tests, a time was given to answer questions, a plan was proposed and they agreed with plan. FINAL IMPRESSION      1. Injury of head, initial encounter    2. Fall, initial encounter    3.  Urinary tract infection without hematuria, site unspecified          DISPOSITION/PLAN   DISPOSITION Decision To Discharge 08/04/2022 06:08:35 PM      PATIENT REFERRED TO:  Sarah Shah MD  [address] [place] [address],[address] [facility]  [phone]    Schedule an appointment as soon as possible for a visit in 2 days  for re-evaluation    [facility]  [address] [facility]  [phone]    If symptoms worsen    DISCHARGE MEDICATIONS:  New Prescriptions    CEFUROXIME (CEFTIN) 250 MG TABLET    Take 1 tablet by mouth in the morning and 1 tablet before bedtime. Do all this for 7 days.        DISCONTINUED MEDICATIONS:  Discontinued Medications    No medications on file              (Please note that portions of this note were completed with a voice recognition program.  Efforts were made to edit the dictations but occasionally words are mis-transcribed.)    LISSETT Craft CNP (electronically signed)         LISSETT Craft CNP  08/04/22 [address] LISSETT Kinney CNP  08/04/22 2010

## 2022-08-06 LAB — S PYO THROAT QL CULT: NORMAL

## 2023-06-27 ENCOUNTER — HOSPITAL ENCOUNTER (EMERGENCY)
Age: 63
Discharge: HOME | End: 2023-06-27
Attending: EMERGENCY MEDICINE
Payer: MEDICARE

## 2023-06-27 VITALS
DIASTOLIC BLOOD PRESSURE: 67 MMHG | TEMPERATURE: 98.7 F | HEART RATE: 86 BPM | SYSTOLIC BLOOD PRESSURE: 127 MMHG | RESPIRATION RATE: 16 BRPM | OXYGEN SATURATION: 97 %

## 2023-06-27 DIAGNOSIS — N30.00 ACUTE CYSTITIS WITHOUT HEMATURIA: Primary | ICD-10-CM

## 2023-06-27 LAB
BACTERIA URNS QL MICRO: ABNORMAL /HPF
BILIRUB UR QL STRIP.AUTO: NEGATIVE
CLARITY UR: CLEAR
COLOR UR: YELLOW
EPI CELLS #/AREA URNS HPF: ABNORMAL /HPF (ref 0–5)
GLUCOSE UR STRIP.AUTO-MCNC: NEGATIVE MG/DL
HGB UR QL STRIP.AUTO: NEGATIVE
KETONES UR STRIP.AUTO-MCNC: NEGATIVE MG/DL
LEUKOCYTE ESTERASE UR QL STRIP.AUTO: ABNORMAL
NITRITE UR QL STRIP.AUTO: POSITIVE
PH UR STRIP.AUTO: 6 [PH] (ref 5–8)
PROT UR STRIP.AUTO-MCNC: NEGATIVE MG/DL
RBC #/AREA URNS HPF: ABNORMAL /HPF (ref 0–4)
SP GR UR STRIP.AUTO: <=1.005 (ref 1–1.03)
UA COMPLETE W REFLEX CULTURE PNL UR: YES
UA DIPSTICK W REFLEX MICRO PNL UR: YES
URN SPEC COLLECT METH UR: ABNORMAL
UROBILINOGEN UR STRIP-ACNC: 0.2 E.U./DL
WBC #/AREA URNS HPF: ABNORMAL /HPF (ref 0–5)

## 2023-06-27 PROCEDURE — 81001 URINALYSIS AUTO W/SCOPE: CPT

## 2023-06-27 PROCEDURE — 87086 URINE CULTURE/COLONY COUNT: CPT

## 2023-06-27 PROCEDURE — 87088 URINE BACTERIA CULTURE: CPT

## 2023-06-27 PROCEDURE — 99284 EMERGENCY DEPT VISIT MOD MDM: CPT

## 2023-06-27 PROCEDURE — 87186 SC STD MICRODIL/AGAR DIL: CPT

## 2023-06-27 PROCEDURE — 6370000000 HC RX 637 (ALT 250 FOR IP): Performed by: EMERGENCY MEDICINE

## 2023-06-27 PROCEDURE — 6370000000 HC RX 637 (ALT 250 FOR IP)

## 2023-06-27 PROCEDURE — 6360000002 HC RX W HCPCS: Performed by: EMERGENCY MEDICINE

## 2023-06-27 PROCEDURE — 96372 THER/PROPH/DIAG INJ SC/IM: CPT

## 2023-06-27 RX ORDER — CEFUROXIME AXETIL 250 MG/1
TABLET ORAL
Status: COMPLETED
Start: 2023-06-27 | End: 2023-06-27

## 2023-06-27 RX ORDER — CEFUROXIME AXETIL 250 MG/1
500 TABLET ORAL EVERY 12 HOURS SCHEDULED
Status: DISCONTINUED | OUTPATIENT
Start: 2023-06-27 | End: 2023-06-27

## 2023-06-27 RX ORDER — ACETAMINOPHEN 325 MG/1
650 TABLET ORAL ONCE
Status: COMPLETED | OUTPATIENT
Start: 2023-06-27 | End: 2023-06-27

## 2023-06-27 RX ORDER — CEFUROXIME AXETIL 250 MG/1
500 TABLET ORAL ONCE
Status: COMPLETED | OUTPATIENT
Start: 2023-06-27 | End: 2023-06-27

## 2023-06-27 RX ORDER — CEFUROXIME AXETIL 250 MG/1
250 TABLET ORAL 2 TIMES DAILY
Qty: 14 TABLET | Refills: 0 | Status: SHIPPED | OUTPATIENT
Start: 2023-06-27 | End: 2023-07-04

## 2023-06-27 RX ORDER — METOCLOPRAMIDE HYDROCHLORIDE 5 MG/ML
10 INJECTION INTRAMUSCULAR; INTRAVENOUS ONCE
Status: COMPLETED | OUTPATIENT
Start: 2023-06-27 | End: 2023-06-27

## 2023-06-27 RX ORDER — ACETAMINOPHEN 500 MG
500 TABLET ORAL 4 TIMES DAILY PRN
Qty: 120 TABLET | Refills: 0 | Status: SHIPPED | OUTPATIENT
Start: 2023-06-27

## 2023-06-27 RX ADMIN — METOCLOPRAMIDE 10 MG: 5 INJECTION, SOLUTION INTRAMUSCULAR; INTRAVENOUS at 05:19

## 2023-06-27 RX ADMIN — ACETAMINOPHEN 650 MG: 325 TABLET ORAL at 02:30

## 2023-06-27 RX ADMIN — CEFUROXIME AXETIL 500 MG: 250 TABLET ORAL at 02:29

## 2023-06-27 ASSESSMENT — PAIN - FUNCTIONAL ASSESSMENT: PAIN_FUNCTIONAL_ASSESSMENT: NONE - DENIES PAIN

## 2023-06-29 LAB
BACTERIA UR CULT: ABNORMAL
ORGANISM: ABNORMAL

## 2023-07-13 ENCOUNTER — HOSPITAL ENCOUNTER (EMERGENCY)
Age: 63
Discharge: HOME | End: 2023-07-13
Payer: MEDICARE

## 2023-07-13 ENCOUNTER — APPOINTMENT (OUTPATIENT)
Dept: GENERAL RADIOLOGY | Age: 63
End: 2023-07-13
Payer: MEDICARE

## 2023-07-13 VITALS
DIASTOLIC BLOOD PRESSURE: 69 MMHG | BODY MASS INDEX: 29.77 KG/M2 | HEART RATE: 93 BPM | OXYGEN SATURATION: 98 % | RESPIRATION RATE: 19 BRPM | TEMPERATURE: 98.6 F | HEIGHT: 63 IN | WEIGHT: 168 LBS | SYSTOLIC BLOOD PRESSURE: 133 MMHG
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
## 2023-07-13 DIAGNOSIS — M17.11 PRIMARY OSTEOARTHRITIS OF RIGHT KNEE: ICD-10-CM

## 2023-07-13 DIAGNOSIS — M25.561 ACUTE PAIN OF RIGHT KNEE: Primary | ICD-10-CM

## 2023-07-13 PROCEDURE — 6370000000 HC RX 637 (ALT 250 FOR IP): Performed by: PHYSICIAN ASSISTANT

## 2023-07-13 PROCEDURE — 73562 X-RAY EXAM OF KNEE 3: CPT

## 2023-07-13 PROCEDURE — 99283 EMERGENCY DEPT VISIT LOW MDM: CPT

## 2023-07-13 RX ORDER — ACETAMINOPHEN 500 MG
1000 TABLET ORAL ONCE
Status: COMPLETED | OUTPATIENT
Start: 2023-07-13 | End: 2023-07-13

## 2023-07-13 RX ADMIN — ACETAMINOPHEN 1000 MG: 500 TABLET ORAL at 09:33

## 2023-07-13 ASSESSMENT — PAIN - FUNCTIONAL ASSESSMENT: PAIN_FUNCTIONAL_ASSESSMENT: NONE - DENIES PAIN

## 2023-07-13 NOTE — ED NOTES
Ace wrap applied to R knee of patient. Discharge instructions went over with patient and patient verbalized understanding. Box lunch also provided to patient.       Derek Mora RN  07/13/23 6806

## 2023-07-13 NOTE — ED PROVIDER NOTES
(Please note that portions of this note were completed with a voice recognition program.  Efforts were made to edit the dictations but occasionally words are mis-transcribed.)    [place] Score, PA-C (electronically signed)        [place] Score, PA-C  07/13/23 0561

## 2023-07-13 NOTE — DISCHARGE INSTRUCTIONS
Please continue with Tylenol for pain control at home. Continue with icing elevation as well as rest at home. Please follow with your primary care provider as well as orthopedics. Return to the ER if you develop any new or worsening symptoms.

## 2023-07-13 NOTE — ED NOTES
Writer called patient's  at this time to let her know about patient discharge per patient request. Shree Fell to let a worker at facility know and come pick her up.       Ras Jackson RN  07/13/23 3372

## 2023-07-17 NOTE — PROGRESS NOTES
[address]      Patient Name: Daniel Cox  Medical Record Number:  [id number]  Primary Care Physician:  Hollie Kelley MD  Date of Consultation: 7/18/2023    Chief Complaint:   Chief Complaint   Patient presents with    Cerumen Impaction     Patient is here today for cerumen impaction, ear cleaning. Patient states she has been having a hard time hearing even with hearing aids in. Patient states the right ear is impacted. Patient denies any ear pain, drainage, or ringing. HISTORY OF PRESENT ILLNESS  Lucie Quevedo is a(n) 70 y.o. female who is a former Dr. Joselo Pineda patient. She has known thyroid nodules which were biopsied and found to be benign. Lucie Quevedo states that she is having difficulty hearing out of the right side more so than the left. She believes she has wax blocking her hearing aids. Patient Active Problem List   Diagnosis    Chest pain    Allergic rhinitis    Mental retardation    Hypercholesteremia    Multinodular thyroid     Past Surgical History:   Procedure Laterality Date    ANKLE SURGERY      COLONOSCOPY  01/10/2012    TUBAL LIGATION      UPPER GASTROINTESTINAL ENDOSCOPY  2/29/16     History reviewed. No pertinent family history.   Social History     Socioeconomic History    Marital status: Single     Spouse name: Not on file    Number of children: Not on file    Years of education: Not on file    Highest education level: Not on file   Occupational History    Not on file   Tobacco Use    Smoking status: Never    Smokeless tobacco: Never   Vaping Use    Vaping Use: Never used   Substance and Sexual Activity    Alcohol use: No    Drug use: No    Sexual activity: Never   Other Topics Concern    Not on file   Social History Narrative    Not on file     Social Determinants of Health     Financial Resource Strain: Not on file   Food Insecurity: Not on file   Transportation Needs: Not on file   Physical Activity: Not on file   Stress: Not on file

## 2023-07-18 ENCOUNTER — OFFICE VISIT (OUTPATIENT)
Dept: ENT CLINIC | Age: 63
End: 2023-07-18

## 2023-07-18 VITALS — WEIGHT: 168 LBS | HEIGHT: 63 IN | TEMPERATURE: 98.6 F | BODY MASS INDEX: 29.77 KG/M2 | OXYGEN SATURATION: 98 %

## 2023-07-18 DIAGNOSIS — H90.3 SENSORINEURAL HEARING LOSS (SNHL) OF BOTH EARS: Primary | ICD-10-CM

## 2023-07-18 DIAGNOSIS — H61.21 IMPACTED CERUMEN, RIGHT EAR: ICD-10-CM

## 2023-07-18 ASSESSMENT — ENCOUNTER SYMPTOMS
NAUSEA: 0
SHORTNESS OF BREATH: 0
RHINORRHEA: 0
VOMITING: 0
EYE PAIN: 0
COUGH: 0

## 2023-09-05 ENCOUNTER — APPOINTMENT (OUTPATIENT)
Dept: GENERAL RADIOLOGY | Age: 63
End: 2023-09-05
Payer: MEDICARE

## 2023-09-05 ENCOUNTER — APPOINTMENT (OUTPATIENT)
Dept: CT IMAGING | Age: 63
End: 2023-09-05
Payer: MEDICARE

## 2023-09-05 ENCOUNTER — HOSPITAL ENCOUNTER (EMERGENCY)
Age: 63
Discharge: HOME | End: 2023-09-05
Payer: MEDICARE

## 2023-09-05 VITALS
OXYGEN SATURATION: 94 % | WEIGHT: 168 LBS | RESPIRATION RATE: 24 BRPM | SYSTOLIC BLOOD PRESSURE: 111 MMHG | TEMPERATURE: 97 F | DIASTOLIC BLOOD PRESSURE: 95 MMHG | HEART RATE: 80 BPM | BODY MASS INDEX: 29.76 KG/M2

## 2023-09-05 DIAGNOSIS — S42.202A CLOSED FRACTURE OF PROXIMAL END OF LEFT HUMERUS, UNSPECIFIED FRACTURE MORPHOLOGY, INITIAL ENCOUNTER: ICD-10-CM

## 2023-09-05 DIAGNOSIS — S09.90XA INJURY OF HEAD, INITIAL ENCOUNTER: ICD-10-CM
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
## 2023-09-05 PROCEDURE — 99284 EMERGENCY DEPT VISIT MOD MDM: CPT

## 2023-09-05 PROCEDURE — 70450 CT HEAD/BRAIN W/O DYE: CPT

## 2023-09-05 PROCEDURE — 73030 X-RAY EXAM OF SHOULDER: CPT

## 2023-09-05 PROCEDURE — 73080 X-RAY EXAM OF ELBOW: CPT

## 2023-09-05 ASSESSMENT — PAIN - FUNCTIONAL ASSESSMENT: PAIN_FUNCTIONAL_ASSESSMENT: 0-10

## 2023-09-05 ASSESSMENT — PAIN SCALES - GENERAL: PAINLEVEL_OUTOF10: 6

## 2023-09-05 NOTE — ED NOTES
RN spoke to [place] to discuss patients discharge with orthopedic follow up. Patient discharged with all belongings and discharge paperwork. Patient's caregiver verbalized understanding of discharge instructions and follow up with Ortho. Patient taken by wheelchair out of ED with caregiver.         Gladys Sepulveda RN  09/05/23 1817

## 2023-09-05 NOTE — ED NOTES
RN spoke to Kyle, patients  regarding patient's reason for ED visit today. [place] states patient lives in an apartment on her own with \"some assistance\". [place] states patient expressed to staff this morning that she tripped and fell and hurt her left elbow. [place] also states, \"it is sometimes hard to tell what is going on with Eloy Cooper because her stories always change\". [place] states she is only concerned with having patients left elbow x-rayed today. Smooth Daugherty, [address] aware.       Francisco Carlos RN  09/05/23 5774

## 2023-09-05 NOTE — ED NOTES
RN spoke to [place], patients  who states she will notify staff to come to ED to pick patient up  RN and Kyle also discussed pain management, [place] states they typically give patient tylenol and tell her when to take it that way pain management is able to be controlled and monitored.       Cristopher Chavis RN  09/05/23 1114

## 2023-09-05 NOTE — ED PROVIDER NOTES
CT Head W/O Contrast    Result Date: 9/5/2023  EXAMINATION: CT OF THE HEAD WITHOUT CONTRAST  9/5/2023 11:26 am TECHNIQUE: CT of the head was performed without the administration of intravenous contrast. Automated exposure control, iterative reconstruction, and/or weight based adjustment of the mA/kV was utilized to reduce the radiation dose to as low as reasonably achievable. COMPARISON: 09/05/2023 HISTORY: ORDERING SYSTEM PROVIDED HISTORY: fell, hit head TECHNOLOGIST PROVIDED HISTORY: Reason for exam:->fell, hit head Has a \"code stroke\" or \"stroke alert\" been called? ->No Decision Support Exception - unselect if not a suspected or confirmed emergency medical condition->Emergency Medical Condition (MA) Reason for Exam: fell hitting top of head today-also fell last night Additional signs and symptoms: c/o head pain/soreness Relevant Medical/Surgical History: hx-mental retardation FINDINGS: BRAIN/VENTRICLES: There is no acute intracranial hemorrhage, mass effect or midline shift. No abnormal extra-axial fluid collection. The gray-white differentiation is maintained without evidence of an acute infarct. There is no evidence of hydrocephalus. ORBITS: The visualized portion of the orbits demonstrate no acute abnormality. SINUSES: The visualized paranasal sinuses and mastoid air cells demonstrate no acute abnormality. SOFT TISSUES/SKULL:  No acute abnormality of the visualized skull or soft tissues. No acute intracranial abnormality. XR SHOULDER LEFT (MIN 2 VIEWS)    Result Date: 9/5/2023  EXAMINATION: 3 XRAY VIEWS OF THE LEFT SHOULDER 9/5/2023 9:44 am COMPARISON: None. HISTORY: ORDERING SYSTEM PROVIDED HISTORY: pain s/p fall TECHNOLOGIST PROVIDED HISTORY: Reason for exam:->pain s/p fall Reason for Exam: fall, left shoulder pain FINDINGS: There is an acute nondisplaced comminuted fracture involving the proximal humerus. There is no dislocation. The bones are demineralized.  Mild-to-moderate degenerative

## 2024-01-26 ENCOUNTER — APPOINTMENT (OUTPATIENT)
Dept: GENERAL RADIOLOGY | Age: 64
End: 2024-01-26
Payer: MEDICARE

## 2024-01-26 ENCOUNTER — HOSPITAL ENCOUNTER (EMERGENCY)
Age: 64
Discharge: HOME | End: 2024-01-26
Attending: EMERGENCY MEDICINE
Payer: MEDICARE

## 2024-01-26 VITALS
HEIGHT: 63 IN | RESPIRATION RATE: 26 BRPM | DIASTOLIC BLOOD PRESSURE: 60 MMHG | SYSTOLIC BLOOD PRESSURE: 102 MMHG | WEIGHT: 170 LBS | BODY MASS INDEX: 30.12 KG/M2 | OXYGEN SATURATION: 97 % | HEART RATE: 67 BPM | TEMPERATURE: 98 F

## 2024-01-26 DIAGNOSIS — S43.402A SPRAIN OF LEFT SHOULDER, UNSPECIFIED SHOULDER SPRAIN TYPE, INITIAL ENCOUNTER: Primary | ICD-10-CM
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
## 2024-01-26 LAB
ALBUMIN SERPL-MCNC: 3.6 G/DL (ref 3.4–5)
ALBUMIN/GLOB SERPL: 1 {RATIO} (ref 1.1–2.2)
ALP SERPL-CCNC: 73 U/L (ref 40–129)
ALT SERPL-CCNC: 23 U/L (ref 10–40)
ANION GAP SERPL CALCULATED.3IONS-SCNC: 11 MMOL/L (ref 3–16)
AST SERPL-CCNC: 21 U/L (ref 15–37)
BACTERIA URNS QL MICRO: ABNORMAL /HPF
BASOPHILS # BLD: 0 K/UL (ref 0–0.2)
BASOPHILS NFR BLD: 0.8 %
BILIRUB SERPL-MCNC: 0.4 MG/DL (ref 0–1)
BILIRUB UR QL STRIP.AUTO: NEGATIVE
BUN SERPL-MCNC: 18 MG/DL (ref 7–20)
CALCIUM SERPL-MCNC: 8.9 MG/DL (ref 8.3–10.6)
CHLORIDE SERPL-SCNC: 100 MMOL/L (ref 99–110)
CLARITY UR: CLEAR
CO2 SERPL-SCNC: 25 MMOL/L (ref 21–32)
COLOR UR: YELLOW
CREAT SERPL-MCNC: 0.6 MG/DL (ref 0.6–1.2)
DEPRECATED RDW RBC AUTO: 13.9 % (ref 12.4–15.4)
EKG ATRIAL RATE: 83 BPM
EKG DIAGNOSIS: NORMAL
EKG P AXIS: 41 DEGREES
EKG P-R INTERVAL: 156 MS
EKG Q-T INTERVAL: 376 MS
EKG QRS DURATION: 84 MS
EKG QTC CALCULATION (BAZETT): 441 MS
EKG R AXIS: 19 DEGREES
EKG T AXIS: 26 DEGREES
EKG VENTRICULAR RATE: 83 BPM
EOSINOPHIL # BLD: 0.1 K/UL (ref 0–0.6)
EOSINOPHIL NFR BLD: 3.3 %
EPI CELLS #/AREA URNS HPF: ABNORMAL /HPF (ref 0–5)
FINE GRAN CASTS #/AREA URNS HPF: ABNORMAL /LPF (ref 0–2)
GFR SERPLBLD CREATININE-BSD FMLA CKD-EPI: >60 ML/MIN/{1.73_M2}
GLUCOSE SERPL-MCNC: 182 MG/DL (ref 70–99)
GLUCOSE UR STRIP.AUTO-MCNC: NEGATIVE MG/DL
HCT VFR BLD AUTO: 37.6 % (ref 36–48)
HGB BLD-MCNC: 12.7 G/DL (ref 12–16)
HGB UR QL STRIP.AUTO: NEGATIVE
KETONES UR STRIP.AUTO-MCNC: NEGATIVE MG/DL
LEUKOCYTE ESTERASE UR QL STRIP.AUTO: NEGATIVE
LYMPHOCYTES # BLD: 1.6 K/UL (ref 1–5.1)
LYMPHOCYTES NFR BLD: 38.4 %
MCH RBC QN AUTO: 30.6 PG (ref 26–34)
MCHC RBC AUTO-ENTMCNC: 33.7 G/DL (ref 31–36)
MCV RBC AUTO: 90.8 FL (ref 80–100)
MONOCYTES # BLD: 0.4 K/UL (ref 0–1.3)
MONOCYTES NFR BLD: 8.7 %
NEUTROPHILS # BLD: 2 K/UL (ref 1.7–7.7)
NEUTROPHILS NFR BLD: 48.8 %
NITRITE UR QL STRIP.AUTO: POSITIVE
PH UR STRIP.AUTO: 7.5 [PH] (ref 5–8)
PLATELET # BLD AUTO: 199 K/UL (ref 135–450)
PMV BLD AUTO: 7.5 FL (ref 5–10.5)
POTASSIUM SERPL-SCNC: 3.9 MMOL/L (ref 3.5–5.1)
PROT SERPL-MCNC: 7.1 G/DL (ref 6.4–8.2)
PROT UR STRIP.AUTO-MCNC: NEGATIVE MG/DL
RBC # BLD AUTO: 4.14 M/UL (ref 4–5.2)
RBC #/AREA URNS HPF: ABNORMAL /HPF (ref 0–4)
SODIUM SERPL-SCNC: 136 MMOL/L (ref 136–145)
SP GR UR STRIP.AUTO: 1.01 (ref 1–1.03)
TROPONIN, HIGH SENSITIVITY: 6 NG/L (ref 0–14)
TROPONIN, HIGH SENSITIVITY: 7 NG/L (ref 0–14)
UA COMPLETE W REFLEX CULTURE PNL UR: ABNORMAL
UA DIPSTICK W REFLEX MICRO PNL UR: YES
URN SPEC COLLECT METH UR: ABNORMAL
UROBILINOGEN UR STRIP-ACNC: 0.2 E.U./DL
WBC # BLD AUTO: 4.1 K/UL (ref 4–11)
WBC #/AREA URNS HPF: ABNORMAL /HPF (ref 0–5)

## 2024-01-26 PROCEDURE — 80053 COMPREHEN METABOLIC PANEL: CPT

## 2024-01-26 PROCEDURE — 93010 ELECTROCARDIOGRAM REPORT: CPT | Performed by: INTERNAL MEDICINE

## 2024-01-26 PROCEDURE — 81001 URINALYSIS AUTO W/SCOPE: CPT

## 2024-01-26 PROCEDURE — 73030 X-RAY EXAM OF SHOULDER: CPT

## 2024-01-26 PROCEDURE — 99285 EMERGENCY DEPT VISIT HI MDM: CPT

## 2024-01-26 PROCEDURE — 93005 ELECTROCARDIOGRAM TRACING: CPT | Performed by: EMERGENCY MEDICINE

## 2024-01-26 PROCEDURE — 85025 COMPLETE CBC W/AUTO DIFF WBC: CPT

## 2024-01-26 PROCEDURE — 84484 ASSAY OF TROPONIN QUANT: CPT

## 2024-01-26 PROCEDURE — 71045 X-RAY EXAM CHEST 1 VIEW: CPT

## 2024-01-26 ASSESSMENT — PAIN SCALES - WONG BAKER: WONGBAKER_NUMERICALRESPONSE: 4

## 2024-01-26 ASSESSMENT — PAIN - FUNCTIONAL ASSESSMENT: PAIN_FUNCTIONAL_ASSESSMENT: WONG-BAKER FACES

## 2024-01-26 NOTE — ED PROVIDER NOTES
Emergency Department Provider Note  Location: [facility]  ED  1/26/2024     Patient Identification  Reena Batista is a 63 y.o. female    Chief Complaint  Shoulder Pain (Patient comes in via [facility] for dizziness. She is complaining of left shoulder pain. Patient is deaf, does not speak ASL so there is a communication barrier. Denies abdominal pain, just continues to point towards her left shoulder. Per EMS this is an ongoing complaint.)          HPI  (History provided by patient and EMS)  Patient is a 63-year-old female who presents by EMS from private residence with initial chief complaint of dizziness.  There is a significant communication barrier here.  Reportedly the patient is deaf and does not use sign language however she appears to read lips and may hear me but is very difficult to understand.  Per the patient she complains of left-sided shoulder pain.  It hurts when she moves her arm.  She reports that she had a fall when she got up last night to use the bathroom and fell next to the bed.  She does not think that she hit her head and she did not lose consciousness.  She reports she was down on the floor for under half hour.  Denies any other symptoms.      Nursing Notes were all reviewed and agreed with, or any disagreements were addressed in the HPI:  Allergies: No Known Allergies    Past medical history:  has a past medical history of Allergic rhinitis, Diabetes mellitus (HCC), Hypercholesteremia, Mental retardation, and No history of procedure (2/29/16).    Past surgical history:  has a past surgical history that includes Tubal ligation; Colonoscopy (01/10/2012); Ankle surgery; and Upper gastrointestinal endoscopy (2/29/16).    Home medications:   Prior to Admission medications    Medication Sig Start Date End Date Taking? Authorizing Provider   acetaminophen (TYLENOL) 500 MG tablet Take 1 tablet by mouth 4 times daily as needed for Pain 6/27/23   Duke Cali MD   lidocaine

## 2024-01-26 NOTE — CARE COORDINATION
Spoke to patient about transport.  She reports [facility] will transport her home.  Call placed to [facility].  She will transport home.  Electronically signed by JARRETT Hill on 1/26/2024 at 9:12 AM

## 2024-12-12 ENCOUNTER — HOSPITAL ENCOUNTER (OUTPATIENT)
Dept: MAMMOGRAPHY | Age: 64
Discharge: HOME | End: 2024-12-12
Attending: FAMILY MEDICINE
Payer: MEDICARE

## 2024-12-12 ENCOUNTER — HOSPITAL ENCOUNTER (OUTPATIENT)
Dept: ULTRASOUND IMAGING | Age: 64
Discharge: HOME | End: 2024-12-12
Attending: FAMILY MEDICINE
Payer: MEDICARE

## 2024-12-12 VITALS — HEIGHT: 63 IN | WEIGHT: 190 LBS | BODY MASS INDEX: 33.66 KG/M2

## 2024-12-12 DIAGNOSIS — Z12.31 BREAST CANCER SCREENING BY MAMMOGRAM: ICD-10-CM

## 2024-12-12 DIAGNOSIS — K86.2 PANCREATIC CYST: ICD-10-CM

## 2024-12-12 PROCEDURE — 76705 ECHO EXAM OF ABDOMEN: CPT

## 2024-12-12 PROCEDURE — 77063 BREAST TOMOSYNTHESIS BI: CPT

## 2025-02-18 ENCOUNTER — HOSPITAL ENCOUNTER (EMERGENCY)
Age: 65
Discharge: HOME | End: 2025-02-18
Attending: EMERGENCY MEDICINE
Payer: MEDICARE

## 2025-02-18 ENCOUNTER — APPOINTMENT (OUTPATIENT)
Dept: GENERAL RADIOLOGY | Age: 65
End: 2025-02-18
Payer: MEDICARE

## 2025-02-18 VITALS
OXYGEN SATURATION: 99 % | DIASTOLIC BLOOD PRESSURE: 55 MMHG | RESPIRATION RATE: 18 BRPM | HEART RATE: 75 BPM | TEMPERATURE: 98.4 F | SYSTOLIC BLOOD PRESSURE: 104 MMHG

## 2025-02-18 DIAGNOSIS — M25.551 RIGHT HIP PAIN: ICD-10-CM
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
## 2025-02-18 DIAGNOSIS — S40.022A ARM CONTUSION, LEFT, INITIAL ENCOUNTER: ICD-10-CM

## 2025-02-18 PROCEDURE — 73060 X-RAY EXAM OF HUMERUS: CPT

## 2025-02-18 PROCEDURE — 99283 EMERGENCY DEPT VISIT LOW MDM: CPT

## 2025-02-18 PROCEDURE — 73502 X-RAY EXAM HIP UNI 2-3 VIEWS: CPT

## 2025-02-18 PROCEDURE — 6370000000 HC RX 637 (ALT 250 FOR IP): Performed by: EMERGENCY MEDICINE

## 2025-02-18 RX ORDER — ACETAMINOPHEN 500 MG
1000 TABLET ORAL
Status: COMPLETED | OUTPATIENT
Start: 2025-02-18 | End: 2025-02-18

## 2025-02-18 RX ADMIN — ACETAMINOPHEN 1000 MG: 500 TABLET ORAL at 21:14

## 2025-02-18 ASSESSMENT — PAIN SCALES - GENERAL
PAINLEVEL_OUTOF10: 6
PAINLEVEL_OUTOF10: 5

## 2025-02-18 ASSESSMENT — PAIN - FUNCTIONAL ASSESSMENT: PAIN_FUNCTIONAL_ASSESSMENT: 0-10

## 2025-02-19 NOTE — DISCHARGE INSTRUCTIONS
Rest, Ice, and Elevate your injured area.     Follow up with your primary doctor for further evaluation.  Return to the ER with any worsening symptoms, or new concerns.

## 2025-02-19 NOTE — ED PROVIDER NOTES
Emergency Department Provider Note  Location: [facility] EMERGENCY DEPARTMENT  2/18/2025     Patient Identification  Reena Batista is a 64 y.o. female    Chief Complaint  Fall (Fall tonight with L arm and R side pain. )          HPI  (History provided by patient)  Patient presents after a fall.  She was in the bathroom and she slipped.  She grabbed onto a handrail and gradually went down to the ground.  She states she injured her left arm and her right buttock area.  No head strike or loss of consciousness.  No open wounds.  No numbness or weakness.  No headache, chest pain, abdominal pain    Nursing Notes reviewed.    Allergies: No Known Allergies    Past medical history:  has a past medical history of Allergic rhinitis, Diabetes mellitus (HCC), Hypercholesteremia, Mental retardation, and No history of procedure (2/29/16).    Past surgical history:  has a past surgical history that includes Tubal ligation; Colonoscopy (01/10/2012); Ankle surgery; and Upper gastrointestinal endoscopy (2/29/16).    Home medications:   Prior to Admission medications    Medication Sig Start Date End Date Taking? Authorizing Provider   acetaminophen (TYLENOL) 500 MG tablet Take 1 tablet by mouth 4 times daily as needed for Pain 6/27/23   Duke Cali MD   lidocaine (LIDODERM) 5 % Place 1 patch onto the skin daily 12 hours on, 12 hours off. 1/7/22   Crystal Lantigua PA-C   ARIPiprazole (ABILIFY) 5 MG tablet  10/25/21   Shon Earl MD   benztropine (COGENTIN) 1 MG tablet  10/25/21   Shon Earl MD   aspirin 81 MG EC tablet Take 1 tablet by mouth daily 4/30/21   Shon Earl MD   esomeprazole (NEXIUM) 40 MG delayed release capsule Take 1 capsule by mouth every morning (before breakfast)    Shon Earl MD   fluticasone (FLONASE) 50 MCG/ACT nasal spray 1 spray by Each Nostril route daily    Shon Earl MD   nystatin (MYCOSTATIN) 496561 UNIT/GM cream Apply topically Three times per

## 2025-03-28 ENCOUNTER — APPOINTMENT (OUTPATIENT)
Dept: GENERAL RADIOLOGY | Age: 65
End: 2025-03-28
Payer: MEDICARE

## 2025-03-28 ENCOUNTER — HOSPITAL ENCOUNTER (EMERGENCY)
Age: 65
Discharge: HOME | End: 2025-03-28
Payer: MEDICARE

## 2025-03-28 VITALS
WEIGHT: 200 LBS | TEMPERATURE: 98 F | SYSTOLIC BLOOD PRESSURE: 132 MMHG | HEIGHT: 63 IN | DIASTOLIC BLOOD PRESSURE: 64 MMHG | OXYGEN SATURATION: 96 % | BODY MASS INDEX: 35.44 KG/M2 | RESPIRATION RATE: 16 BRPM | HEART RATE: 93 BPM

## 2025-03-28 DIAGNOSIS — M79.602 LEFT ARM PAIN: ICD-10-CM
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
## 2025-03-28 DIAGNOSIS — M25.552 LEFT HIP PAIN: ICD-10-CM

## 2025-03-28 PROCEDURE — 99283 EMERGENCY DEPT VISIT LOW MDM: CPT

## 2025-03-28 PROCEDURE — 73060 X-RAY EXAM OF HUMERUS: CPT

## 2025-03-28 PROCEDURE — 6370000000 HC RX 637 (ALT 250 FOR IP): Performed by: PHYSICIAN ASSISTANT

## 2025-03-28 PROCEDURE — 73502 X-RAY EXAM HIP UNI 2-3 VIEWS: CPT

## 2025-03-28 RX ORDER — ACETAMINOPHEN 325 MG/1
650 TABLET ORAL ONCE
Status: COMPLETED | OUTPATIENT
Start: 2025-03-28 | End: 2025-03-28

## 2025-03-28 RX ADMIN — ACETAMINOPHEN 650 MG: 325 TABLET ORAL at 20:46

## 2025-03-28 ASSESSMENT — PAIN DESCRIPTION - LOCATION
LOCATION_2: SHOULDER
LOCATION: HIP

## 2025-03-28 ASSESSMENT — PAIN SCALES - WONG BAKER: WONGBAKER_NUMERICALRESPONSE: HURTS LITTLE MORE

## 2025-03-28 ASSESSMENT — PAIN DESCRIPTION - INTENSITY: RATING_2: 4

## 2025-03-28 ASSESSMENT — PAIN - FUNCTIONAL ASSESSMENT: PAIN_FUNCTIONAL_ASSESSMENT: WONG-BAKER FACES

## 2025-03-28 ASSESSMENT — PAIN DESCRIPTION - ORIENTATION
ORIENTATION: LEFT
ORIENTATION_2: LEFT
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
## 2025-06-09 ENCOUNTER — APPOINTMENT (OUTPATIENT)
Dept: GENERAL RADIOLOGY | Age: 65
End: 2025-06-09
Payer: MEDICARE

## 2025-06-09 ENCOUNTER — HOSPITAL ENCOUNTER (EMERGENCY)
Age: 65
Discharge: HOME | End: 2025-06-09
Attending: EMERGENCY MEDICINE
Payer: MEDICARE

## 2025-06-09 VITALS
HEART RATE: 92 BPM | TEMPERATURE: 98 F | SYSTOLIC BLOOD PRESSURE: 106 MMHG | OXYGEN SATURATION: 95 % | BODY MASS INDEX: 35.08 KG/M2 | RESPIRATION RATE: 16 BRPM | WEIGHT: 198 LBS | HEIGHT: 63 IN | DIASTOLIC BLOOD PRESSURE: 75 MMHG
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
## 2025-06-09 DIAGNOSIS — R05.9 COUGH, UNSPECIFIED TYPE: Primary | ICD-10-CM

## 2025-06-09 DIAGNOSIS — M79.632 PAIN OF LEFT FOREARM: ICD-10-CM

## 2025-06-09 LAB
ANION GAP SERPL CALCULATED.3IONS-SCNC: 13 MMOL/L (ref 3–16)
BASOPHILS # BLD: 0 K/UL (ref 0–0.2)
BASOPHILS NFR BLD: 0.9 %
BUN SERPL-MCNC: 17 MG/DL (ref 7–20)
CALCIUM SERPL-MCNC: 8.7 MG/DL (ref 8.3–10.6)
CHLORIDE SERPL-SCNC: 104 MMOL/L (ref 99–110)
CO2 SERPL-SCNC: 25 MMOL/L (ref 21–32)
CREAT SERPL-MCNC: 0.7 MG/DL (ref 0.6–1.2)
DEPRECATED RDW RBC AUTO: 13.6 % (ref 12.4–15.4)
EOSINOPHIL # BLD: 0.1 K/UL (ref 0–0.6)
EOSINOPHIL NFR BLD: 2.7 %
FLUAV RNA RESP QL NAA+PROBE: NOT DETECTED
FLUBV RNA RESP QL NAA+PROBE: NOT DETECTED
GFR SERPLBLD CREATININE-BSD FMLA CKD-EPI: >90 ML/MIN/{1.73_M2}
GLUCOSE SERPL-MCNC: 139 MG/DL (ref 70–99)
HCT VFR BLD AUTO: 38.6 % (ref 36–48)
HGB BLD-MCNC: 13.2 G/DL (ref 12–16)
LYMPHOCYTES # BLD: 1.1 K/UL (ref 1–5.1)
LYMPHOCYTES NFR BLD: 28.5 %
MCH RBC QN AUTO: 31.6 PG (ref 26–34)
MCHC RBC AUTO-ENTMCNC: 34.2 G/DL (ref 31–36)
MCV RBC AUTO: 92.2 FL (ref 80–100)
MONOCYTES # BLD: 0.4 K/UL (ref 0–1.3)
MONOCYTES NFR BLD: 9.6 %
NEUTROPHILS # BLD: 2.3 K/UL (ref 1.7–7.7)
NEUTROPHILS NFR BLD: 58.3 %
PLATELET # BLD AUTO: 191 K/UL (ref 135–450)
PMV BLD AUTO: 7.6 FL (ref 5–10.5)
POTASSIUM SERPL-SCNC: 4.1 MMOL/L (ref 3.5–5.1)
RBC # BLD AUTO: 4.19 M/UL (ref 4–5.2)
SARS-COV-2 RNA RESP QL NAA+PROBE: NOT DETECTED
SODIUM SERPL-SCNC: 142 MMOL/L (ref 136–145)
TROPONIN, HIGH SENSITIVITY: 8 NG/L (ref 0–14)
WBC # BLD AUTO: 4 K/UL (ref 4–11)

## 2025-06-09 PROCEDURE — 36415 COLL VENOUS BLD VENIPUNCTURE: CPT

## 2025-06-09 PROCEDURE — 6370000000 HC RX 637 (ALT 250 FOR IP): Performed by: EMERGENCY MEDICINE

## 2025-06-09 PROCEDURE — 85025 COMPLETE CBC W/AUTO DIFF WBC: CPT

## 2025-06-09 PROCEDURE — 80048 BASIC METABOLIC PNL TOTAL CA: CPT

## 2025-06-09 PROCEDURE — 73090 X-RAY EXAM OF FOREARM: CPT

## 2025-06-09 PROCEDURE — 87636 SARSCOV2 & INF A&B AMP PRB: CPT

## 2025-06-09 PROCEDURE — 99285 EMERGENCY DEPT VISIT HI MDM: CPT

## 2025-06-09 PROCEDURE — 93005 ELECTROCARDIOGRAM TRACING: CPT | Performed by: EMERGENCY MEDICINE

## 2025-06-09 PROCEDURE — 84484 ASSAY OF TROPONIN QUANT: CPT

## 2025-06-09 PROCEDURE — 71045 X-RAY EXAM CHEST 1 VIEW: CPT

## 2025-06-09 RX ORDER — ACETAMINOPHEN 500 MG
1000 TABLET ORAL ONCE
Status: COMPLETED | OUTPATIENT
Start: 2025-06-09 | End: 2025-06-09

## 2025-06-09 RX ORDER — BENZONATATE 100 MG/1
100 CAPSULE ORAL ONCE
Status: COMPLETED | OUTPATIENT
Start: 2025-06-09 | End: 2025-06-09

## 2025-06-09 RX ORDER — BENZONATATE 100 MG/1
100 CAPSULE ORAL 2 TIMES DAILY PRN
Qty: 10 CAPSULE | Refills: 0 | Status: SHIPPED | OUTPATIENT
Start: 2025-06-09 | End: 2025-06-14

## 2025-06-09 RX ADMIN — ACETAMINOPHEN 1000 MG: 500 TABLET ORAL at 06:05

## 2025-06-09 RX ADMIN — BENZONATATE 100 MG: 100 CAPSULE ORAL at 06:05

## 2025-06-09 ASSESSMENT — PAIN SCALES - GENERAL
PAINLEVEL_OUTOF10: 0
PAINLEVEL_OUTOF10: 10

## 2025-06-09 ASSESSMENT — PAIN DESCRIPTION - LOCATION: LOCATION: GENERALIZED

## 2025-06-09 NOTE — DISCHARGE INSTRUCTIONS
Tylenol or ibuprofen may be given as needed for pain.  The x-ray of the chest and arm were normal.  The blood work was normal

## 2025-06-09 NOTE — ED PROVIDER NOTES
EMERGENCY DEPARTMENT ENCOUNTER     [facility] EMERGENCY DEPARTMENT     Pt Name: Reena Batista   MRN: [id number]   Birthdate 1960   Date of evaluation: 6/9/2025   Provider: Kate Wright MD   PCP: Jeremías Carson MD   Note Started: 6:17 AM EDT 6/9/25     CHIEF COMPLAINT:     Chief Complaint   Patient presents with    Illness     Pt from home. States feeling unwell for a couple days. Aggressive cough during triage.   Fall a few days ago, complains of left arm pain.         HISTORY OF PRESENT ILLNESS:  Adult female who comes in for cough and generalized pain.  She is uncertain how long she has been coughing.  She denies any fevers or chills.  She has chest pain associated with coughing but also has pain all over her body.  She also stated that she fell out of her bed this morning and now she is having left arm pain.  No shortness of breath.  Patient has a history of MRDD.  She lives in a group home and the facility was contacted.  They mention that the patient has a private apartment in the group home and that staff is there several hours a day.  At night the patient is by herself.  Patient has a history of significant other patient's symptoms and I did have a patient who fell a few days ago and another patient with a cough.  The  was with the patient yesterday and she was fine however she was not with her through the night.    PHYSICAL EXAM:    ED Triage Vitals   BP Systolic BP Percentile Diastolic BP Percentile Temp Temp Source Pulse Respirations SpO2   06/09/25 0524 -- -- 06/09/25 0524 06/09/25 0524 06/09/25 0524 06/09/25 0524 06/09/25 0524   133/63   98.2 °F (36.8 °C) Oral 93 25 95 %      Height Weight - Scale         06/09/25 0526 06/09/25 0526         1.6 m (5' 3\") 89.8 kg (198 lb)              Physical Exam  Vitals and nursing note reviewed.   Constitutional:       Appearance: Normal appearance. She is well-developed. She is not ill-appearing.   HENT:      Head: Normocephalic and

## 2025-06-09 NOTE — ED NOTES
Patient ambulated to and from bathroom with assistance and a steady gait. Patient back in bed at this time with both rails up and no complaints at this time.

## 2025-06-10 LAB
EKG ATRIAL RATE: 82 BPM
EKG DIAGNOSIS: NORMAL
EKG P AXIS: 43 DEGREES
EKG P-R INTERVAL: 174 MS
EKG Q-T INTERVAL: 378 MS
EKG QRS DURATION: 76 MS
EKG QTC CALCULATION (BAZETT): 441 MS
EKG R AXIS: 34 DEGREES
EKG T AXIS: 45 DEGREES
EKG VENTRICULAR RATE: 82 BPM

## 2025-06-10 PROCEDURE — 93010 ELECTROCARDIOGRAM REPORT: CPT | Performed by: INTERNAL MEDICINE
